# Patient Record
Sex: FEMALE | Race: BLACK OR AFRICAN AMERICAN | NOT HISPANIC OR LATINO | Employment: OTHER | URBAN - METROPOLITAN AREA
[De-identification: names, ages, dates, MRNs, and addresses within clinical notes are randomized per-mention and may not be internally consistent; named-entity substitution may affect disease eponyms.]

---

## 2023-04-20 DIAGNOSIS — I10 ESSENTIAL HYPERTENSION: ICD-10-CM

## 2023-04-21 PROBLEM — M54.50 LOWER BACK PAIN: Status: ACTIVE | Noted: 2023-04-21

## 2023-04-21 PROBLEM — M25.579 PAINFUL ANKLE: Status: ACTIVE | Noted: 2023-04-21

## 2023-04-21 PROBLEM — I10 ESSENTIAL HYPERTENSION: Status: ACTIVE | Noted: 2023-04-21

## 2023-04-21 PROBLEM — I24.9 ACS (ACUTE CORONARY SYNDROME) (MULTI): Status: ACTIVE | Noted: 2023-04-21

## 2023-04-21 PROBLEM — R10.11 ABDOMINAL PAIN, RUQ: Status: ACTIVE | Noted: 2023-04-21

## 2023-04-21 PROBLEM — R93.89 ABNORMAL MRI: Status: ACTIVE | Noted: 2023-04-21

## 2023-04-21 PROBLEM — D64.9 ANEMIA: Status: ACTIVE | Noted: 2023-04-21

## 2023-04-21 PROBLEM — E11.9 DIABETES MELLITUS (MULTI): Status: ACTIVE | Noted: 2023-04-21

## 2023-04-21 PROBLEM — M79.672 FOOT PAIN, BILATERAL: Status: ACTIVE | Noted: 2023-04-21

## 2023-04-21 PROBLEM — H10.13 ALLERGIC CONJUNCTIVITIS OF BOTH EYES: Status: ACTIVE | Noted: 2023-04-21

## 2023-04-21 PROBLEM — M54.2 NECK PAIN: Status: ACTIVE | Noted: 2023-04-21

## 2023-04-21 PROBLEM — J06.9 VIRAL UPPER RESPIRATORY TRACT INFECTION: Status: ACTIVE | Noted: 2023-04-21

## 2023-04-21 PROBLEM — M25.551 PAIN IN RIGHT HIP: Status: ACTIVE | Noted: 2023-04-21

## 2023-04-21 PROBLEM — M25.511 RIGHT SHOULDER PAIN: Status: ACTIVE | Noted: 2023-04-21

## 2023-04-21 PROBLEM — S46.911A STRAIN OF RIGHT SHOULDER: Status: ACTIVE | Noted: 2023-04-21

## 2023-04-21 PROBLEM — M79.671 FOOT PAIN, BILATERAL: Status: ACTIVE | Noted: 2023-04-21

## 2023-04-21 PROBLEM — R79.89 ELEVATED TROPONIN: Status: ACTIVE | Noted: 2023-04-21

## 2023-04-21 PROBLEM — L28.2 PRURITIC RASH: Status: ACTIVE | Noted: 2023-04-21

## 2023-04-21 PROBLEM — I25.10 ATHEROSCLEROTIC HEART DISEASE OF NATIVE CORONARY ARTERY WITHOUT ANGINA PECTORIS: Status: ACTIVE | Noted: 2023-04-21

## 2023-04-21 PROBLEM — D35.2 PITUITARY BENIGN NEOPLASM (MULTI): Status: ACTIVE | Noted: 2023-04-21

## 2023-04-21 PROBLEM — K52.9 GASTROENTERITIS: Status: ACTIVE | Noted: 2023-04-21

## 2023-04-21 PROBLEM — R05.9 COUGH: Status: ACTIVE | Noted: 2023-04-21

## 2023-04-21 PROBLEM — M19.90 ARTHRITIS: Status: ACTIVE | Noted: 2023-04-21

## 2023-04-21 PROBLEM — R12 HEARTBURN: Status: ACTIVE | Noted: 2023-04-21

## 2023-04-21 PROBLEM — R53.81 MALAISE: Status: ACTIVE | Noted: 2023-04-21

## 2023-04-21 PROBLEM — H10.9 BACTERIAL CONJUNCTIVITIS OF LEFT EYE: Status: ACTIVE | Noted: 2023-04-21

## 2023-04-21 PROBLEM — E78.5 HYPERLIPIDEMIA: Status: ACTIVE | Noted: 2023-04-21

## 2023-04-21 PROBLEM — N95.1 MENOPAUSE SYNDROME: Status: ACTIVE | Noted: 2023-04-21

## 2023-04-21 PROBLEM — E83.52 HYPERCALCEMIA: Status: ACTIVE | Noted: 2023-04-21

## 2023-04-21 PROBLEM — S39.012A STRAIN, BACK, INITIAL ENCOUNTER: Status: ACTIVE | Noted: 2023-04-21

## 2023-04-21 PROBLEM — F17.201 NICOTINE DEPENDENCE IN REMISSION: Status: ACTIVE | Noted: 2023-04-21

## 2023-04-21 PROBLEM — R63.4 ABNORMAL WEIGHT LOSS: Status: ACTIVE | Noted: 2023-04-21

## 2023-04-21 PROBLEM — E55.9 VITAMIN D DEFICIENCY: Status: ACTIVE | Noted: 2023-04-21

## 2023-04-21 PROBLEM — L03.032 PARONYCHIA OF GREAT TOE OF LEFT FOOT: Status: ACTIVE | Noted: 2023-04-21

## 2023-04-21 PROBLEM — G45.4 TRANSIENT GLOBAL AMNESIA: Status: ACTIVE | Noted: 2023-04-21

## 2023-04-21 PROBLEM — B37.31 VAGINAL YEAST INFECTION: Status: ACTIVE | Noted: 2023-04-21

## 2023-04-21 RX ORDER — CHLORTHALIDONE 25 MG/1
TABLET ORAL
Qty: 90 TABLET | Refills: 2 | Status: SHIPPED | OUTPATIENT
Start: 2023-04-21 | End: 2024-01-19

## 2023-05-11 DIAGNOSIS — I10 ESSENTIAL HYPERTENSION: ICD-10-CM

## 2023-05-13 RX ORDER — RAMIPRIL 10 MG/1
CAPSULE ORAL
Qty: 90 CAPSULE | Refills: 1 | Status: SHIPPED | OUTPATIENT
Start: 2023-05-13 | End: 2023-11-09

## 2023-05-23 DIAGNOSIS — E11.69 TYPE 2 DIABETES MELLITUS WITH OTHER SPECIFIED COMPLICATION, WITHOUT LONG-TERM CURRENT USE OF INSULIN (MULTI): ICD-10-CM

## 2023-05-23 RX ORDER — GLYBURIDE 5 MG/1
TABLET ORAL
Qty: 120 TABLET | Refills: 2 | Status: SHIPPED | OUTPATIENT
Start: 2023-05-23 | End: 2024-01-11

## 2023-05-24 ENCOUNTER — OFFICE VISIT (OUTPATIENT)
Dept: PRIMARY CARE | Facility: CLINIC | Age: 78
End: 2023-05-24
Payer: MEDICARE

## 2023-05-24 VITALS
WEIGHT: 135 LBS | DIASTOLIC BLOOD PRESSURE: 76 MMHG | HEART RATE: 68 BPM | BODY MASS INDEX: 21.79 KG/M2 | SYSTOLIC BLOOD PRESSURE: 128 MMHG

## 2023-05-24 DIAGNOSIS — I25.10 ATHEROSCLEROSIS OF NATIVE CORONARY ARTERY WITHOUT ANGINA PECTORIS, UNSPECIFIED WHETHER NATIVE OR TRANSPLANTED HEART: ICD-10-CM

## 2023-05-24 DIAGNOSIS — D35.2 PITUITARY BENIGN NEOPLASM (MULTI): ICD-10-CM

## 2023-05-24 DIAGNOSIS — E11.69 TYPE 2 DIABETES MELLITUS WITH OTHER SPECIFIED COMPLICATION, UNSPECIFIED WHETHER LONG TERM INSULIN USE (MULTI): ICD-10-CM

## 2023-05-24 DIAGNOSIS — R63.4 WEIGHT LOSS, UNINTENTIONAL: Primary | ICD-10-CM

## 2023-05-24 DIAGNOSIS — E78.2 MIXED HYPERLIPIDEMIA: ICD-10-CM

## 2023-05-24 DIAGNOSIS — I10 ESSENTIAL HYPERTENSION: ICD-10-CM

## 2023-05-24 DIAGNOSIS — E55.9 VITAMIN D DEFICIENCY: ICD-10-CM

## 2023-05-24 PROBLEM — M54.2 NECK PAIN: Status: RESOLVED | Noted: 2023-04-21 | Resolved: 2023-05-24

## 2023-05-24 PROBLEM — K52.9 GASTROENTERITIS: Status: RESOLVED | Noted: 2023-04-21 | Resolved: 2023-05-24

## 2023-05-24 PROBLEM — R10.11 ABDOMINAL PAIN, RUQ: Status: RESOLVED | Noted: 2023-04-21 | Resolved: 2023-05-24

## 2023-05-24 LAB
POC FINGERSTICK BLOOD GLUCOSE: 180 MG/DL (ref 70–100)
POC HEMOGLOBIN A1C: 7.3 % (ref 4.2–6.5)

## 2023-05-24 PROCEDURE — 83036 HEMOGLOBIN GLYCOSYLATED A1C: CPT | Performed by: INTERNAL MEDICINE

## 2023-05-24 PROCEDURE — 1160F RVW MEDS BY RX/DR IN RCRD: CPT | Performed by: INTERNAL MEDICINE

## 2023-05-24 PROCEDURE — 3078F DIAST BP <80 MM HG: CPT | Performed by: INTERNAL MEDICINE

## 2023-05-24 PROCEDURE — G0439 PPPS, SUBSEQ VISIT: HCPCS | Performed by: INTERNAL MEDICINE

## 2023-05-24 PROCEDURE — 1159F MED LIST DOCD IN RCRD: CPT | Performed by: INTERNAL MEDICINE

## 2023-05-24 PROCEDURE — 1036F TOBACCO NON-USER: CPT | Performed by: INTERNAL MEDICINE

## 2023-05-24 PROCEDURE — 82962 GLUCOSE BLOOD TEST: CPT | Performed by: INTERNAL MEDICINE

## 2023-05-24 PROCEDURE — 3074F SYST BP LT 130 MM HG: CPT | Performed by: INTERNAL MEDICINE

## 2023-05-24 PROCEDURE — 1170F FXNL STATUS ASSESSED: CPT | Performed by: INTERNAL MEDICINE

## 2023-05-24 PROCEDURE — 99214 OFFICE O/P EST MOD 30 MIN: CPT | Performed by: INTERNAL MEDICINE

## 2023-05-24 RX ORDER — ACETAMINOPHEN 500 MG
250 TABLET ORAL DAILY
COMMUNITY
Start: 2013-09-12

## 2023-05-24 RX ORDER — LANOLIN ALCOHOL/MO/W.PET/CERES
1 CREAM (GRAM) TOPICAL DAILY
COMMUNITY
Start: 2014-12-28

## 2023-05-24 RX ORDER — GABAPENTIN 300 MG/1
300 CAPSULE ORAL NIGHTLY
COMMUNITY

## 2023-05-24 RX ORDER — EMPAGLIFLOZIN 10 MG/1
10 TABLET, FILM COATED ORAL DAILY
COMMUNITY
Start: 2023-05-23 | End: 2023-05-24 | Stop reason: DRUGHIGH

## 2023-05-24 RX ORDER — ATORVASTATIN CALCIUM 80 MG/1
80 TABLET, FILM COATED ORAL DAILY
COMMUNITY
End: 2023-06-05

## 2023-05-24 RX ORDER — NAPROXEN SODIUM 220 MG/1
81 TABLET, FILM COATED ORAL
COMMUNITY

## 2023-05-24 RX ORDER — METFORMIN HYDROCHLORIDE 500 MG/1
500 TABLET, EXTENDED RELEASE ORAL
COMMUNITY
Start: 2014-01-02

## 2023-05-24 ASSESSMENT — ACTIVITIES OF DAILY LIVING (ADL)
GROCERY_SHOPPING: INDEPENDENT
BATHING: INDEPENDENT
DRESSING: INDEPENDENT
TAKING_MEDICATION: INDEPENDENT
MANAGING_FINANCES: INDEPENDENT

## 2023-05-24 ASSESSMENT — ENCOUNTER SYMPTOMS
UNEXPECTED WEIGHT CHANGE: 1
DIAPHORESIS: 0
CHOKING: 0
VOMITING: 0
FLANK PAIN: 0
EYE PAIN: 0
APPETITE CHANGE: 0
WOUND: 0
WEAKNESS: 0
SINUS PRESSURE: 0
DIARRHEA: 0
ADENOPATHY: 0
DIFFICULTY URINATING: 0
SHORTNESS OF BREATH: 0
SINUS PAIN: 0
TROUBLE SWALLOWING: 0
NECK STIFFNESS: 0
PHOTOPHOBIA: 0
EYE REDNESS: 0
FACIAL SWELLING: 0
SORE THROAT: 0
HEMATURIA: 0
DIZZINESS: 0
ANAL BLEEDING: 0
DYSURIA: 0
PALPITATIONS: 0
FREQUENCY: 0
SLEEP DISTURBANCE: 0
FATIGUE: 0
STRIDOR: 0
BLOOD IN STOOL: 0
ACTIVITY CHANGE: 0
BACK PAIN: 0
NECK PAIN: 0
POLYPHAGIA: 0
COLOR CHANGE: 0
FACIAL ASYMMETRY: 0
SEIZURES: 0
CHILLS: 0
RECTAL PAIN: 0
COUGH: 0
CONSTIPATION: 0
EYE ITCHING: 0
LIGHT-HEADEDNESS: 0
EYE DISCHARGE: 0
NUMBNESS: 0
SPEECH DIFFICULTY: 0
CHEST TIGHTNESS: 0
ARTHRALGIAS: 1
VOICE CHANGE: 0
JOINT SWELLING: 0
RHINORRHEA: 0
ABDOMINAL PAIN: 0
NAUSEA: 0
ABDOMINAL DISTENTION: 0
POLYDIPSIA: 0
TREMORS: 0
MYALGIAS: 0
HEADACHES: 0
BRUISES/BLEEDS EASILY: 0
WHEEZING: 0

## 2023-05-24 ASSESSMENT — PATIENT HEALTH QUESTIONNAIRE - PHQ9
2. FEELING DOWN, DEPRESSED OR HOPELESS: NOT AT ALL
SUM OF ALL RESPONSES TO PHQ9 QUESTIONS 1 AND 2: 0
1. LITTLE INTEREST OR PLEASURE IN DOING THINGS: NOT AT ALL

## 2023-05-24 NOTE — PROGRESS NOTES
Subjective   Patient ID: Julio Dutta Snyder is a 78 y.o. female who presents for Medicare Annual Wellness Visit Subsequent.    Patient presents for wellness exam and follow-up.  She has been compliant with her medications and diet but not exercise.  She reports having a normal appetite but continues to lose weight.  She had negative colonoscopy done.  She denies any headaches, no dizziness, no chest pain or shortness of breath.  She denies abdominal pain no nausea vomiting or diarrhea.  She reports baseline arthritis symptoms.         Review of Systems   Constitutional:  Positive for unexpected weight change. Negative for activity change, appetite change, chills, diaphoresis and fatigue.   HENT:  Negative for congestion, dental problem, drooling, ear discharge, ear pain, facial swelling, hearing loss, mouth sores, nosebleeds, postnasal drip, rhinorrhea, sinus pressure, sinus pain, sneezing, sore throat, tinnitus, trouble swallowing and voice change.    Eyes:  Negative for photophobia, pain, discharge, redness, itching and visual disturbance.   Respiratory:  Negative for cough, choking, chest tightness, shortness of breath, wheezing and stridor.    Cardiovascular:  Negative for chest pain, palpitations and leg swelling.   Gastrointestinal:  Negative for abdominal distention, abdominal pain, anal bleeding, blood in stool, constipation, diarrhea, nausea, rectal pain and vomiting.   Endocrine: Negative for cold intolerance, heat intolerance, polydipsia, polyphagia and polyuria.   Genitourinary:  Negative for decreased urine volume, difficulty urinating, dysuria, enuresis, flank pain, frequency, genital sores, hematuria and urgency.   Musculoskeletal:  Positive for arthralgias. Negative for back pain, gait problem, joint swelling, myalgias, neck pain and neck stiffness.   Skin:  Negative for color change, pallor, rash and wound.   Neurological:  Negative for dizziness, tremors, seizures, syncope, facial  asymmetry, speech difficulty, weakness, light-headedness, numbness and headaches.   Hematological:  Negative for adenopathy. Does not bruise/bleed easily.   Psychiatric/Behavioral:  Negative for sleep disturbance.        Objective   /76   Pulse 68   Wt 61.2 kg (135 lb)   BMI 21.79 kg/m²     Physical Exam  Constitutional:       Appearance: Normal appearance.   Cardiovascular:      Rate and Rhythm: Normal rate and regular rhythm.      Heart sounds: No murmur heard.     No gallop.   Pulmonary:      Effort: No respiratory distress.      Breath sounds: No wheezing or rales.   Abdominal:      General: There is no distension.      Palpations: There is no mass.      Tenderness: There is no abdominal tenderness. There is no guarding.   Musculoskeletal:      Right lower leg: No edema.      Left lower leg: No edema.   Neurological:      Mental Status: She is alert.         Assessment/Plan   Diagnoses and all orders for this visit:  Weight loss, unintentional-we will check routine labs and a CT scan.  Encourage increased p.o. intake.  We will schedule appointment with GI  -     CT chest abdomen pelvis wo IV contrast; Future  -     CBC and Auto Differential; Future  -     TSH with reflex to Free T4 if abnormal; Future  Type 2 diabetes mellitus with other specified complication, unspecified whether long term insulin use (CMS/HCC)-hemoglobin A1c was 7.3.  She will keep appointment with endocrinology.  Schedule an ophthalmology appointment  -     POCT glycosylated hemoglobin (Hb A1C) manually resulted  -     POCT Fingerstick Glucose manually resulted  -     Creatinine, Serum; Future  Pituitary benign neoplasm (CMS/HCC)-we will schedule follow-up MRI  -     MR sella w or wo IV contrast volumetric surgical planning; Future  Essential hypertension-stable on present medications  -     Uric Acid; Future  -     Urinalysis with Reflex Microscopic; Future  -     Albumin , Urine Random; Future  -     Comprehensive Metabolic Panel;  Future  Mixed hyperlipidemia-check a fast lipid profile.  -     Lipid Panel; Future  Vitamin D deficiency  -     Vitamin D, Total; Future  Atherosclerosis of native coronary artery without angina pectoris, unspecified whether native or transplanted heart-we will modify risk factors.  Follow-up with cardiology  Health maintenance-she will schedule a mammogram.  Bone density is pending.  She will schedule her colonoscopy.

## 2023-05-24 NOTE — PATIENT INSTRUCTIONS
Please schedule your CAT scan and MRI.  Obtain blood work and urine.  Schedule appointment with GI.  Follow-up in 1 month.

## 2023-06-04 DIAGNOSIS — E78.5 HYPERLIPIDEMIA, UNSPECIFIED HYPERLIPIDEMIA TYPE: ICD-10-CM

## 2023-06-05 RX ORDER — ATORVASTATIN CALCIUM 80 MG/1
TABLET, FILM COATED ORAL
Qty: 90 TABLET | Refills: 2 | Status: SHIPPED | OUTPATIENT
Start: 2023-06-05 | End: 2024-03-01

## 2023-06-26 ENCOUNTER — APPOINTMENT (OUTPATIENT)
Dept: PRIMARY CARE | Facility: CLINIC | Age: 78
End: 2023-06-26
Payer: MEDICARE

## 2023-06-26 LAB
ANION GAP IN SER/PLAS: 12 MMOL/L (ref 10–20)
CALCIUM (MG/DL) IN SER/PLAS: 10.7 MG/DL (ref 8.6–10.6)
CARBON DIOXIDE, TOTAL (MMOL/L) IN SER/PLAS: 35 MMOL/L (ref 21–32)
CHLORIDE (MMOL/L) IN SER/PLAS: 95 MMOL/L (ref 98–107)
CHOLESTEROL (MG/DL) IN SER/PLAS: 124 MG/DL (ref 0–199)
CHOLESTEROL IN HDL (MG/DL) IN SER/PLAS: 64.5 MG/DL
CHOLESTEROL/HDL RATIO: 1.9
CREATININE (MG/DL) IN SER/PLAS: 0.74 MG/DL (ref 0.5–1.05)
ESTIMATED AVERAGE GLUCOSE FOR HBA1C: 186 MG/DL
GFR FEMALE: 83 ML/MIN/1.73M2
GLUCOSE (MG/DL) IN SER/PLAS: 172 MG/DL (ref 74–99)
HEMOGLOBIN A1C/HEMOGLOBIN TOTAL IN BLOOD: 8.1 %
LDL: 48 MG/DL (ref 0–99)
POTASSIUM (MMOL/L) IN SER/PLAS: 3.7 MMOL/L (ref 3.5–5.3)
SODIUM (MMOL/L) IN SER/PLAS: 138 MMOL/L (ref 136–145)
TRIGLYCERIDE (MG/DL) IN SER/PLAS: 60 MG/DL (ref 0–149)
UREA NITROGEN (MG/DL) IN SER/PLAS: 12 MG/DL (ref 6–23)
VLDL: 12 MG/DL (ref 0–40)

## 2023-06-30 ENCOUNTER — OFFICE VISIT (OUTPATIENT)
Dept: PRIMARY CARE | Facility: CLINIC | Age: 78
End: 2023-06-30
Payer: MEDICARE

## 2023-06-30 VITALS
WEIGHT: 136 LBS | HEART RATE: 72 BPM | SYSTOLIC BLOOD PRESSURE: 118 MMHG | DIASTOLIC BLOOD PRESSURE: 76 MMHG | BODY MASS INDEX: 21.95 KG/M2

## 2023-06-30 DIAGNOSIS — I10 ESSENTIAL HYPERTENSION: ICD-10-CM

## 2023-06-30 DIAGNOSIS — I25.10 ATHEROSCLEROSIS OF NATIVE CORONARY ARTERY WITHOUT ANGINA PECTORIS, UNSPECIFIED WHETHER NATIVE OR TRANSPLANTED HEART: ICD-10-CM

## 2023-06-30 DIAGNOSIS — Z79.4 TYPE 2 DIABETES MELLITUS WITHOUT COMPLICATION, WITH LONG-TERM CURRENT USE OF INSULIN (MULTI): ICD-10-CM

## 2023-06-30 DIAGNOSIS — Z79.4 TYPE 2 DIABETES MELLITUS WITH OTHER SPECIFIED COMPLICATION, WITH LONG-TERM CURRENT USE OF INSULIN (MULTI): ICD-10-CM

## 2023-06-30 DIAGNOSIS — E78.2 MIXED HYPERLIPIDEMIA: Primary | ICD-10-CM

## 2023-06-30 DIAGNOSIS — R63.4 ABNORMAL WEIGHT LOSS: ICD-10-CM

## 2023-06-30 DIAGNOSIS — E11.69 TYPE 2 DIABETES MELLITUS WITH OTHER SPECIFIED COMPLICATION, WITH LONG-TERM CURRENT USE OF INSULIN (MULTI): ICD-10-CM

## 2023-06-30 DIAGNOSIS — E11.9 TYPE 2 DIABETES MELLITUS WITHOUT COMPLICATION, WITH LONG-TERM CURRENT USE OF INSULIN (MULTI): ICD-10-CM

## 2023-06-30 PROCEDURE — 1036F TOBACCO NON-USER: CPT | Performed by: INTERNAL MEDICINE

## 2023-06-30 PROCEDURE — 1159F MED LIST DOCD IN RCRD: CPT | Performed by: INTERNAL MEDICINE

## 2023-06-30 PROCEDURE — 3074F SYST BP LT 130 MM HG: CPT | Performed by: INTERNAL MEDICINE

## 2023-06-30 PROCEDURE — 99213 OFFICE O/P EST LOW 20 MIN: CPT | Performed by: INTERNAL MEDICINE

## 2023-06-30 PROCEDURE — 3078F DIAST BP <80 MM HG: CPT | Performed by: INTERNAL MEDICINE

## 2023-06-30 PROCEDURE — 1160F RVW MEDS BY RX/DR IN RCRD: CPT | Performed by: INTERNAL MEDICINE

## 2023-06-30 RX ORDER — BLOOD-GLUCOSE METER
KIT MISCELLANEOUS
Qty: 100 STRIP | Refills: 3 | Status: SHIPPED | OUTPATIENT
Start: 2023-06-30 | End: 2023-11-02

## 2023-06-30 ASSESSMENT — ENCOUNTER SYMPTOMS
UNEXPECTED WEIGHT CHANGE: 1
HYPERTENSION: 1

## 2023-06-30 NOTE — PROGRESS NOTES
Subjective   Patient ID: Julio Dutta [place] is a 78 y.o. female who presents for Hypertension.    Patient presents for follow-up.  She has been compliant with her medications, diet but not exercise.  She reports improvement in her hand pain.  She denies any headaches, no dizziness, no chest pain or shortness of breath.  She denies abdominal pain no nausea vomiting or diarrhea.  She reports no new musculoskeletal complaints.  She is resting.  DVT      Hypertension         Review of Systems   Constitutional:  Positive for unexpected weight change.       Objective   /76   Pulse 72   Wt 61.7 kg (136 lb)   BMI 21.95 kg/m²     Physical Exam  Constitutional:       Appearance: Normal appearance.   Cardiovascular:      Rate and Rhythm: Normal rate and regular rhythm.      Heart sounds: No murmur heard.     No gallop.   Pulmonary:      Effort: No respiratory distress.      Breath sounds: No wheezing or rales.   Abdominal:      General: There is no distension.      Palpations: There is no mass.      Tenderness: There is no abdominal tenderness. There is no guarding.   Musculoskeletal:      Right lower leg: No edema.      Left lower leg: No edema.   Neurological:      Mental Status: She is alert.         Assessment/Plan   Diagnoses and all orders for this visit:  Mixed hyperlipidemia-we will continue with present management.  LDL goal less than 70  Essential hypertension-stable on present medications  Atherosclerosis of native coronary artery without angina pectoris, unspecified whether native or transplanted heart-we will modify risk factors.  Follow cardiology  Type 2 diabetes mellitus without complication, with long-term current use of insulin (CMS/[facility])-hemoglobin A1c was 8.1.  Jardiance dose was adjusted.  Ophthalmology appointment has been done.  Schedule appointment with endocrinology   Abnormal weight loss-she will schedule appointment with GI.-Encourage increased p.o. intake.  Health  maintenance-colonoscopy is pending.  Mammogram is pending.  Bone density is pending.  Pap with GYN.  She will get a tetanus shot at the pharmacy

## 2023-08-31 PROBLEM — Z95.1 HX OF CABG: Status: ACTIVE | Noted: 2023-08-31

## 2023-08-31 RX ORDER — NITROGLYCERIN 0.4 MG/1
TABLET SUBLINGUAL EVERY 5 MIN PRN
COMMUNITY
End: 2024-02-21 | Stop reason: SDUPTHER

## 2023-08-31 RX ORDER — TRAMADOL HYDROCHLORIDE AND ACETAMINOPHEN 37.5; 325 MG/1; MG/1
1 TABLET, FILM COATED ORAL EVERY 6 HOURS PRN
COMMUNITY
End: 2024-02-13 | Stop reason: WASHOUT

## 2023-08-31 RX ORDER — ISOSORBIDE MONONITRATE 30 MG/1
1 TABLET, EXTENDED RELEASE ORAL DAILY
COMMUNITY
Start: 2020-09-17 | End: 2024-02-21 | Stop reason: ALTCHOICE

## 2023-08-31 RX ORDER — DIPHENHYDRAMINE HCL 25 MG
25 TABLET ORAL EVERY 6 HOURS PRN
COMMUNITY

## 2023-08-31 RX ORDER — MULTIVIT-MIN/IRON/FOLIC ACID/K 18-600-40
1 CAPSULE ORAL DAILY
COMMUNITY
Start: 2020-09-30

## 2023-08-31 RX ORDER — SITAGLIPTIN 100 MG/1
1 TABLET, FILM COATED ORAL DAILY
COMMUNITY
Start: 2023-01-03 | End: 2024-02-13 | Stop reason: WASHOUT

## 2023-08-31 RX ORDER — NITROGLYCERIN 0.4 MG/1
TABLET SUBLINGUAL
COMMUNITY
Start: 2023-06-21

## 2023-08-31 RX ORDER — OMEPRAZOLE 40 MG/1
40 CAPSULE, DELAYED RELEASE ORAL
COMMUNITY
End: 2024-02-21 | Stop reason: ALTCHOICE

## 2023-08-31 RX ORDER — PANTOPRAZOLE SODIUM 40 MG/1
1 TABLET, DELAYED RELEASE ORAL DAILY
COMMUNITY
Start: 2020-09-30

## 2023-08-31 RX ORDER — CLOPIDOGREL BISULFATE 75 MG/1
1 TABLET ORAL DAILY
COMMUNITY
Start: 2020-09-25 | End: 2024-02-21 | Stop reason: ALTCHOICE

## 2023-08-31 RX ORDER — DIPHENHYDRAMINE HCL 25 MG
25 CAPSULE ORAL NIGHTLY
COMMUNITY
End: 2024-02-21 | Stop reason: SDUPTHER

## 2023-09-11 ENCOUNTER — APPOINTMENT (OUTPATIENT)
Dept: PRIMARY CARE | Facility: CLINIC | Age: 78
End: 2023-09-11
Payer: MEDICARE

## 2023-09-11 DIAGNOSIS — I10 PRIMARY HYPERTENSION: Primary | ICD-10-CM

## 2023-09-11 RX ORDER — METOPROLOL TARTRATE 50 MG/1
TABLET ORAL
Qty: 180 TABLET | Refills: 1 | Status: SHIPPED | OUTPATIENT
Start: 2023-09-11 | End: 2024-03-11

## 2023-10-09 ENCOUNTER — TELEPHONE (OUTPATIENT)
Dept: PRIMARY CARE | Facility: CLINIC | Age: 78
End: 2023-10-09

## 2023-10-12 ENCOUNTER — OFFICE VISIT (OUTPATIENT)
Dept: PRIMARY CARE | Facility: CLINIC | Age: 78
End: 2023-10-12
Payer: MEDICARE

## 2023-10-12 VITALS — BODY MASS INDEX: 22.44 KG/M2 | WEIGHT: 139 LBS

## 2023-10-12 DIAGNOSIS — E11.9 TYPE 2 DIABETES MELLITUS WITHOUT COMPLICATION, WITH LONG-TERM CURRENT USE OF INSULIN (MULTI): ICD-10-CM

## 2023-10-12 DIAGNOSIS — Z23 NEED FOR INFLUENZA VACCINATION: ICD-10-CM

## 2023-10-12 DIAGNOSIS — I10 ESSENTIAL HYPERTENSION: ICD-10-CM

## 2023-10-12 DIAGNOSIS — Z79.4 TYPE 2 DIABETES MELLITUS WITHOUT COMPLICATION, WITH LONG-TERM CURRENT USE OF INSULIN (MULTI): ICD-10-CM

## 2023-10-12 DIAGNOSIS — E78.2 MIXED HYPERLIPIDEMIA: ICD-10-CM

## 2023-10-12 DIAGNOSIS — R53.81 MALAISE: ICD-10-CM

## 2023-10-12 DIAGNOSIS — I25.10 ATHEROSCLEROSIS OF NATIVE CORONARY ARTERY WITHOUT ANGINA PECTORIS, UNSPECIFIED WHETHER NATIVE OR TRANSPLANTED HEART: Primary | ICD-10-CM

## 2023-10-12 DIAGNOSIS — E55.9 VITAMIN D DEFICIENCY: ICD-10-CM

## 2023-10-12 LAB
POC FINGERSTICK BLOOD GLUCOSE: 159 MG/DL (ref 70–100)
POC HEMOGLOBIN A1C: 7.3 % (ref 4.2–6.5)

## 2023-10-12 PROCEDURE — 82962 GLUCOSE BLOOD TEST: CPT | Performed by: INTERNAL MEDICINE

## 2023-10-12 PROCEDURE — 90662 IIV NO PRSV INCREASED AG IM: CPT | Performed by: INTERNAL MEDICINE

## 2023-10-12 PROCEDURE — 1036F TOBACCO NON-USER: CPT | Performed by: INTERNAL MEDICINE

## 2023-10-12 PROCEDURE — G0008 ADMIN INFLUENZA VIRUS VAC: HCPCS | Performed by: INTERNAL MEDICINE

## 2023-10-12 PROCEDURE — 1126F AMNT PAIN NOTED NONE PRSNT: CPT | Performed by: INTERNAL MEDICINE

## 2023-10-12 PROCEDURE — 1159F MED LIST DOCD IN RCRD: CPT | Performed by: INTERNAL MEDICINE

## 2023-10-12 PROCEDURE — 83036 HEMOGLOBIN GLYCOSYLATED A1C: CPT | Performed by: INTERNAL MEDICINE

## 2023-10-12 PROCEDURE — 1160F RVW MEDS BY RX/DR IN RCRD: CPT | Performed by: INTERNAL MEDICINE

## 2023-10-12 PROCEDURE — 99496 TRANSJ CARE MGMT HIGH F2F 7D: CPT | Performed by: INTERNAL MEDICINE

## 2023-10-12 SDOH — HEALTH STABILITY: PHYSICAL HEALTH: ON AVERAGE, HOW MANY DAYS PER WEEK DO YOU ENGAGE IN MODERATE TO STRENUOUS EXERCISE (LIKE A BRISK WALK)?: 0 DAYS

## 2023-10-12 SDOH — HEALTH STABILITY: PHYSICAL HEALTH: ON AVERAGE, HOW MANY MINUTES DO YOU ENGAGE IN EXERCISE AT THIS LEVEL?: 0 MIN

## 2023-10-12 ASSESSMENT — ENCOUNTER SYMPTOMS
SEIZURES: 0
ARTHRALGIAS: 0
POLYDIPSIA: 0
HEADACHES: 0
DIZZINESS: 0
BACK PAIN: 0
PHOTOPHOBIA: 0
ANAL BLEEDING: 0
SORE THROAT: 0
EYE PAIN: 0
MYALGIAS: 0
DIARRHEA: 0
POLYPHAGIA: 0
HEMATURIA: 0
EYE REDNESS: 0
APPETITE CHANGE: 0
EYE ITCHING: 0
ABDOMINAL PAIN: 0
NUMBNESS: 0
WEAKNESS: 0
FATIGUE: 0
SINUS PRESSURE: 0
FACIAL SWELLING: 0
STRIDOR: 0
LIGHT-HEADEDNESS: 0
VOICE CHANGE: 0
PALPITATIONS: 0
ADENOPATHY: 0
BRUISES/BLEEDS EASILY: 0
FREQUENCY: 0
FLANK PAIN: 0
BLOOD IN STOOL: 0
CONSTIPATION: 0
NECK STIFFNESS: 0
SHORTNESS OF BREATH: 0
CHILLS: 0
SPEECH DIFFICULTY: 0
RHINORRHEA: 0
NAUSEA: 0
ABDOMINAL DISTENTION: 0
CHOKING: 0
CHEST TIGHTNESS: 0
RECTAL PAIN: 0
SLEEP DISTURBANCE: 0
SINUS PAIN: 0
WOUND: 0
JOINT SWELLING: 0
WHEEZING: 0
DIFFICULTY URINATING: 0
COUGH: 0
VOMITING: 0
FACIAL ASYMMETRY: 0
DIAPHORESIS: 0
EYE DISCHARGE: 0
TROUBLE SWALLOWING: 0
ACTIVITY CHANGE: 0
NECK PAIN: 0
COLOR CHANGE: 0
DYSURIA: 0
TREMORS: 0

## 2023-10-12 ASSESSMENT — LIFESTYLE VARIABLES
SKIP TO QUESTIONS 9-10: 1
HOW MANY STANDARD DRINKS CONTAINING ALCOHOL DO YOU HAVE ON A TYPICAL DAY: 1 OR 2
HOW OFTEN DO YOU HAVE A DRINK CONTAINING ALCOHOL: MONTHLY OR LESS
AUDIT-C TOTAL SCORE: 1
HOW OFTEN DO YOU HAVE SIX OR MORE DRINKS ON ONE OCCASION: NEVER

## 2023-10-12 NOTE — PROGRESS NOTES
Subjective   Patient ID: Julio Dutta [place] is a 78 y.o. female who presents for Hypertension and Diabetes.    Patient presents for follow-up.  She has been compliant with her medications, diet but not exercise.  She is currently without new complaints.  She denies any headaches, no dizziness, no sinus problems, no chest pain or shortness of breath.  She denies any abdominal pain no nausea vomiting or diarrhea.  She reports baseline back pain with radiation down her legs.         Review of Systems   Constitutional:  Negative for activity change, appetite change, chills, diaphoresis and fatigue.   HENT:  Negative for congestion, dental problem, drooling, ear discharge, ear pain, facial swelling, hearing loss, mouth sores, nosebleeds, postnasal drip, rhinorrhea, sinus pressure, sinus pain, sneezing, sore throat, tinnitus, trouble swallowing and voice change.    Eyes:  Negative for photophobia, pain, discharge, redness, itching and visual disturbance.   Respiratory:  Negative for cough, choking, chest tightness, shortness of breath, wheezing and stridor.    Cardiovascular:  Negative for chest pain, palpitations and leg swelling.   Gastrointestinal:  Negative for abdominal distention, abdominal pain, anal bleeding, blood in stool, constipation, diarrhea, nausea, rectal pain and vomiting.   Endocrine: Negative for cold intolerance, heat intolerance, polydipsia, polyphagia and polyuria.   Genitourinary:  Negative for decreased urine volume, difficulty urinating, dysuria, enuresis, flank pain, frequency, genital sores, hematuria and urgency.   Musculoskeletal:  Negative for arthralgias, back pain, gait problem, joint swelling, myalgias, neck pain and neck stiffness.   Skin:  Negative for color change, pallor, rash and wound.   Neurological:  Negative for dizziness, tremors, seizures, syncope, facial asymmetry, speech difficulty, weakness, light-headedness, numbness and headaches.   Hematological:  Negative for  adenopathy. Does not bruise/bleed easily.   Psychiatric/Behavioral:  Negative for sleep disturbance.        Objective   Wt 63 kg (139 lb)   BMI 22.44 kg/m²     Physical Exam  Constitutional:       Appearance: Normal appearance.   Cardiovascular:      Rate and Rhythm: Normal rate and regular rhythm.      Heart sounds: No murmur heard.     No gallop.   Pulmonary:      Effort: No respiratory distress.      Breath sounds: No wheezing or rales.   Abdominal:      General: There is no distension.      Palpations: There is no mass.      Tenderness: There is no abdominal tenderness. There is no guarding.   Musculoskeletal:      Right lower leg: No edema.      Left lower leg: No edema.   Neurological:      Mental Status: She is alert.         Assessment/Plan   Diagnoses and all orders for this visit:  Atherosclerosis of native coronary artery without angina pectoris, unspecified whether native or transplanted heart-we will modify risk factors.  Follow-up with cardiology  Type 2 diabetes mellitus without complication, with long-term current use of insulin (CMS/[facility])-hemoglobin A1c was 7.3.  Ophthalmology appointment has been done.  She will follow-up with endocrinology  -     POCT Fingerstick Glucose manually resulted  -     POCT glycosylated hemoglobin (Hb A1C) manually resulted  Essential hypertension-low-salt diet and exercise  -     Uric Acid; Future  -     Urinalysis with Reflex Microscopic; Future  -     Albumin , Urine Random; Future  -     Comprehensive Metabolic Panel; Future  Mixed hyperlipidemia-check a fasting lipid profile  -     Lipid Panel; Future  Malaise  -     CBC and Auto Differential; Future  -     TSH with reflex to Free T4 if abnormal; Future  Vitamin D deficiency  -     Vitamin D 25-Hydroxy,Total (for eval of Vitamin D levels); Future  Need for influenza vaccination  -     Flu vaccine, quadrivalent, high-dose, preservative free, age 65y+ (FLUZONE)  Health maintenance-mammogram outside clinic.  Pap with  GYN.  We will give a flu shot today.  She will schedule her colonoscopy

## 2023-10-12 NOTE — PATIENT INSTRUCTIONS
Please take medication as prescribed.  Follow-up in 3 months.  Diet and exercise.  Obtain fasting blood work and urine.

## 2023-10-30 DIAGNOSIS — Z79.4 TYPE 2 DIABETES MELLITUS WITH OTHER SPECIFIED COMPLICATION, WITH LONG-TERM CURRENT USE OF INSULIN (MULTI): ICD-10-CM

## 2023-10-30 DIAGNOSIS — E11.69 TYPE 2 DIABETES MELLITUS WITH OTHER SPECIFIED COMPLICATION, WITH LONG-TERM CURRENT USE OF INSULIN (MULTI): ICD-10-CM

## 2023-11-02 DIAGNOSIS — Z79.4 TYPE 2 DIABETES MELLITUS WITH OTHER SPECIFIED COMPLICATION, WITH LONG-TERM CURRENT USE OF INSULIN (MULTI): ICD-10-CM

## 2023-11-02 DIAGNOSIS — E11.69 TYPE 2 DIABETES MELLITUS WITH OTHER SPECIFIED COMPLICATION, WITH LONG-TERM CURRENT USE OF INSULIN (MULTI): ICD-10-CM

## 2023-11-02 RX ORDER — BLOOD-GLUCOSE METER
KIT MISCELLANEOUS
Qty: 100 STRIP | Refills: 3 | Status: SHIPPED | OUTPATIENT
Start: 2023-11-02 | End: 2023-11-02 | Stop reason: SDUPTHER

## 2023-11-02 RX ORDER — BLOOD-GLUCOSE METER
KIT MISCELLANEOUS
Qty: 100 STRIP | Refills: 3 | Status: SHIPPED | OUTPATIENT
Start: 2023-11-02

## 2023-11-08 DIAGNOSIS — I10 ESSENTIAL HYPERTENSION: ICD-10-CM

## 2023-11-09 RX ORDER — RAMIPRIL 10 MG/1
CAPSULE ORAL
Qty: 90 CAPSULE | Refills: 1 | Status: SHIPPED | OUTPATIENT
Start: 2023-11-09 | End: 2024-04-30

## 2024-01-02 DIAGNOSIS — E11.9 TYPE 2 DIABETES MELLITUS WITHOUT COMPLICATION, WITH LONG-TERM CURRENT USE OF INSULIN (MULTI): Primary | ICD-10-CM

## 2024-01-02 DIAGNOSIS — Z79.4 TYPE 2 DIABETES MELLITUS WITHOUT COMPLICATION, WITH LONG-TERM CURRENT USE OF INSULIN (MULTI): Primary | ICD-10-CM

## 2024-01-11 DIAGNOSIS — E11.69 TYPE 2 DIABETES MELLITUS WITH OTHER SPECIFIED COMPLICATION, WITHOUT LONG-TERM CURRENT USE OF INSULIN (MULTI): ICD-10-CM

## 2024-01-11 RX ORDER — GLYBURIDE 5 MG/1
TABLET ORAL
Qty: 120 TABLET | Refills: 2 | Status: SHIPPED | OUTPATIENT
Start: 2024-01-11 | End: 2024-04-15

## 2024-01-19 DIAGNOSIS — I10 ESSENTIAL HYPERTENSION: ICD-10-CM

## 2024-01-19 RX ORDER — CHLORTHALIDONE 25 MG/1
TABLET ORAL
Qty: 90 TABLET | Refills: 2 | Status: SHIPPED | OUTPATIENT
Start: 2024-01-19

## 2024-01-24 ENCOUNTER — TELEPHONE (OUTPATIENT)
Dept: PRIMARY CARE | Facility: CLINIC | Age: 79
End: 2024-01-24
Payer: MEDICARE

## 2024-01-24 DIAGNOSIS — E55.9 VITAMIN D DEFICIENCY: ICD-10-CM

## 2024-01-24 DIAGNOSIS — E78.2 MIXED HYPERLIPIDEMIA: ICD-10-CM

## 2024-01-24 DIAGNOSIS — I10 ESSENTIAL HYPERTENSION: Primary | ICD-10-CM

## 2024-01-24 DIAGNOSIS — R53.81 MALAISE: ICD-10-CM

## 2024-01-24 NOTE — TELEPHONE ENCOUNTER
Patient needs blood work order in the system so that she can do the lab work that you wanted her to do, so she can be obedient

## 2024-02-13 ENCOUNTER — OFFICE VISIT (OUTPATIENT)
Dept: PRIMARY CARE | Facility: CLINIC | Age: 79
End: 2024-02-13
Payer: MEDICARE

## 2024-02-13 ENCOUNTER — TELEPHONE (OUTPATIENT)
Dept: PRIMARY CARE | Facility: CLINIC | Age: 79
End: 2024-02-13

## 2024-02-13 VITALS
WEIGHT: 136.4 LBS | DIASTOLIC BLOOD PRESSURE: 78 MMHG | HEART RATE: 92 BPM | BODY MASS INDEX: 22.02 KG/M2 | SYSTOLIC BLOOD PRESSURE: 136 MMHG

## 2024-02-13 DIAGNOSIS — Z12.11 COLON CANCER SCREENING: ICD-10-CM

## 2024-02-13 DIAGNOSIS — I25.10 ATHEROSCLEROSIS OF NATIVE CORONARY ARTERY WITHOUT ANGINA PECTORIS, UNSPECIFIED WHETHER NATIVE OR TRANSPLANTED HEART: ICD-10-CM

## 2024-02-13 DIAGNOSIS — E11.69 TYPE 2 DIABETES MELLITUS WITH OTHER SPECIFIED COMPLICATION, UNSPECIFIED WHETHER LONG TERM INSULIN USE (MULTI): ICD-10-CM

## 2024-02-13 DIAGNOSIS — Z12.11 SCREENING FOR COLON CANCER: Primary | ICD-10-CM

## 2024-02-13 DIAGNOSIS — E78.2 MIXED HYPERLIPIDEMIA: ICD-10-CM

## 2024-02-13 DIAGNOSIS — D35.2 PITUITARY BENIGN NEOPLASM (MULTI): ICD-10-CM

## 2024-02-13 DIAGNOSIS — I10 ESSENTIAL HYPERTENSION: ICD-10-CM

## 2024-02-13 LAB — POC HEMOGLOBIN A1C: 7.7 % (ref 4.2–6.5)

## 2024-02-13 PROCEDURE — 1126F AMNT PAIN NOTED NONE PRSNT: CPT | Performed by: INTERNAL MEDICINE

## 2024-02-13 PROCEDURE — 1036F TOBACCO NON-USER: CPT | Performed by: INTERNAL MEDICINE

## 2024-02-13 PROCEDURE — 83036 HEMOGLOBIN GLYCOSYLATED A1C: CPT | Performed by: INTERNAL MEDICINE

## 2024-02-13 PROCEDURE — 3075F SYST BP GE 130 - 139MM HG: CPT | Performed by: INTERNAL MEDICINE

## 2024-02-13 PROCEDURE — 1160F RVW MEDS BY RX/DR IN RCRD: CPT | Performed by: INTERNAL MEDICINE

## 2024-02-13 PROCEDURE — 1159F MED LIST DOCD IN RCRD: CPT | Performed by: INTERNAL MEDICINE

## 2024-02-13 PROCEDURE — 3078F DIAST BP <80 MM HG: CPT | Performed by: INTERNAL MEDICINE

## 2024-02-13 PROCEDURE — 99213 OFFICE O/P EST LOW 20 MIN: CPT | Performed by: INTERNAL MEDICINE

## 2024-02-13 RX ORDER — POLYETHYLENE GLYCOL 3350, SODIUM SULFATE ANHYDROUS, SODIUM BICARBONATE, SODIUM CHLORIDE, POTASSIUM CHLORIDE 236; 22.74; 6.74; 5.86; 2.97 G/4L; G/4L; G/4L; G/4L; G/4L
4000 POWDER, FOR SOLUTION ORAL ONCE
Qty: 4000 ML | Refills: 0 | Status: SHIPPED | OUTPATIENT
Start: 2024-02-13 | End: 2024-02-13

## 2024-02-13 ASSESSMENT — PATIENT HEALTH QUESTIONNAIRE - PHQ9
SUM OF ALL RESPONSES TO PHQ9 QUESTIONS 1 AND 2: 0
1. LITTLE INTEREST OR PLEASURE IN DOING THINGS: NOT AT ALL
2. FEELING DOWN, DEPRESSED OR HOPELESS: NOT AT ALL

## 2024-02-13 ASSESSMENT — PAIN SCALES - GENERAL: PAINLEVEL: 0-NO PAIN

## 2024-02-13 ASSESSMENT — ENCOUNTER SYMPTOMS
CHILLS: 0
DIAPHORESIS: 0
PALPITATIONS: 0
ADENOPATHY: 0
ABDOMINAL DISTENTION: 0
WEAKNESS: 0
SINUS PRESSURE: 0
BACK PAIN: 0
SPEECH DIFFICULTY: 0
VOICE CHANGE: 0
EYE REDNESS: 0
FACIAL SWELLING: 0
NAUSEA: 0
SLEEP DISTURBANCE: 0
ANAL BLEEDING: 0
EYE ITCHING: 0
BLOOD IN STOOL: 0
ARTHRALGIAS: 0
BRUISES/BLEEDS EASILY: 0
COLOR CHANGE: 0
HEADACHES: 0
FACIAL ASYMMETRY: 0
TROUBLE SWALLOWING: 0
NUMBNESS: 0
LIGHT-HEADEDNESS: 0
VOMITING: 0
POLYDIPSIA: 0
APPETITE CHANGE: 0
RECTAL PAIN: 0
PHOTOPHOBIA: 0
EYE DISCHARGE: 0
FREQUENCY: 0
DIZZINESS: 0
JOINT SWELLING: 0
CHOKING: 0
CHEST TIGHTNESS: 0
SINUS PAIN: 0
TREMORS: 0
FATIGUE: 0
ABDOMINAL PAIN: 0
SEIZURES: 0
DYSURIA: 0
EYE PAIN: 0
RHINORRHEA: 0
MYALGIAS: 0
WOUND: 0
NECK PAIN: 0
STRIDOR: 0
COUGH: 0
NECK STIFFNESS: 0
WHEEZING: 0
CONSTIPATION: 0
DIARRHEA: 0
ACTIVITY CHANGE: 0
HEMATURIA: 0
SHORTNESS OF BREATH: 0
FLANK PAIN: 0
POLYPHAGIA: 0
SORE THROAT: 0
DIFFICULTY URINATING: 0

## 2024-02-13 NOTE — PATIENT INSTRUCTIONS
Please take medication as prescribed.  Follow-up in 3 months.  Schedule appoint with endocrinology and schedule your colonoscopy.  Obtain fasting blood work and urine.

## 2024-02-13 NOTE — PROGRESS NOTES
Subjective   Patient ID: Julio Dutta [place] is a 78 y.o. female who presents for No chief complaint on file..    Patient presents for follow-up.  She has been compliant with his medications, diet but not exercise.  She is currently without new complaints.  She reports baseline arthritis symptoms.  She denies any headaches, no dizziness, no chest pain or shortness of breath.  She denies abdominal pain no nausea vomiting or diarrhea.         Review of Systems   Constitutional:  Negative for activity change, appetite change, chills, diaphoresis and fatigue.   HENT:  Negative for congestion, dental problem, drooling, ear discharge, ear pain, facial swelling, hearing loss, mouth sores, nosebleeds, postnasal drip, rhinorrhea, sinus pressure, sinus pain, sneezing, sore throat, tinnitus, trouble swallowing and voice change.    Eyes:  Negative for photophobia, pain, discharge, redness, itching and visual disturbance.   Respiratory:  Negative for cough, choking, chest tightness, shortness of breath, wheezing and stridor.    Cardiovascular:  Negative for chest pain, palpitations and leg swelling.   Gastrointestinal:  Negative for abdominal distention, abdominal pain, anal bleeding, blood in stool, constipation, diarrhea, nausea, rectal pain and vomiting.   Endocrine: Negative for cold intolerance, heat intolerance, polydipsia, polyphagia and polyuria.   Genitourinary:  Negative for decreased urine volume, difficulty urinating, dysuria, enuresis, flank pain, frequency, genital sores, hematuria and urgency.   Musculoskeletal:  Negative for arthralgias, back pain, gait problem, joint swelling, myalgias, neck pain and neck stiffness.   Skin:  Negative for color change, pallor, rash and wound.   Neurological:  Negative for dizziness, tremors, seizures, syncope, facial asymmetry, speech difficulty, weakness, light-headedness, numbness and headaches.   Hematological:  Negative for adenopathy. Does not bruise/bleed easily.    Psychiatric/Behavioral:  Negative for sleep disturbance.        Objective   /78   Pulse 92   Wt 61.9 kg (136 lb 6.4 oz)   BMI 22.02 kg/m²     Physical Exam  Constitutional:       Appearance: Normal appearance.   Cardiovascular:      Rate and Rhythm: Normal rate and regular rhythm.      Heart sounds: No murmur heard.     No gallop.   Pulmonary:      Effort: No respiratory distress.      Breath sounds: No wheezing or rales.   Abdominal:      General: There is no distension.      Palpations: There is no mass.      Tenderness: There is no abdominal tenderness. There is no guarding.   Musculoskeletal:      Right lower leg: No edema.      Left lower leg: No edema.   Neurological:      Mental Status: She is alert.         Assessment/Plan   Diagnoses and all orders for this visit:  Screening for colon cancer  -     Colonoscopy Screening; Average Risk Patient; Future  Pituitary benign neoplasm (CMS/HCC)-MRI has been done.  Type 2 diabetes mellitus with other specified complication, unspecified whether long term insulin use (CMS/HCC)-hemoglobin A1c was 7.7.  Ophthalmology appointment has been done.  Will continue with present management  -     POCT glycosylated hemoglobin (Hb A1C) manually resulted  Atherosclerosis of native coronary artery without angina pectoris, unspecified whether native or transplanted heart-schedule follow-up appoint with cardiology.  Modify risk factor  Essential hypertension-low-salt diet and exercise  Mixed hyperlipidemia-check a fast lipid profile.  Health maintenance-she will schedule colonoscopy.  Mammogram has been done.  Bone density has been done.  She will get the RSV vaccine at the pharmacy.  Weight loss-weight is stabilized.  It is purposeful.

## 2024-02-21 ENCOUNTER — OFFICE VISIT (OUTPATIENT)
Dept: ENDOCRINOLOGY | Facility: CLINIC | Age: 79
End: 2024-02-21
Payer: MEDICARE

## 2024-02-21 VITALS
RESPIRATION RATE: 16 BRPM | SYSTOLIC BLOOD PRESSURE: 110 MMHG | HEIGHT: 66 IN | WEIGHT: 139 LBS | BODY MASS INDEX: 22.34 KG/M2 | DIASTOLIC BLOOD PRESSURE: 60 MMHG | HEART RATE: 76 BPM

## 2024-02-21 DIAGNOSIS — E78.5 HYPERLIPIDEMIA, UNSPECIFIED HYPERLIPIDEMIA TYPE: ICD-10-CM

## 2024-02-21 DIAGNOSIS — E11.69 TYPE 2 DIABETES MELLITUS WITH OTHER SPECIFIED COMPLICATION, UNSPECIFIED WHETHER LONG TERM INSULIN USE (MULTI): Primary | ICD-10-CM

## 2024-02-21 DIAGNOSIS — I10 ESSENTIAL HYPERTENSION: ICD-10-CM

## 2024-02-21 PROCEDURE — 1159F MED LIST DOCD IN RCRD: CPT | Performed by: INTERNAL MEDICINE

## 2024-02-21 PROCEDURE — 1160F RVW MEDS BY RX/DR IN RCRD: CPT | Performed by: INTERNAL MEDICINE

## 2024-02-21 PROCEDURE — 1036F TOBACCO NON-USER: CPT | Performed by: INTERNAL MEDICINE

## 2024-02-21 PROCEDURE — 1126F AMNT PAIN NOTED NONE PRSNT: CPT | Performed by: INTERNAL MEDICINE

## 2024-02-21 PROCEDURE — 99214 OFFICE O/P EST MOD 30 MIN: CPT | Performed by: INTERNAL MEDICINE

## 2024-02-21 PROCEDURE — 3074F SYST BP LT 130 MM HG: CPT | Performed by: INTERNAL MEDICINE

## 2024-02-21 PROCEDURE — 3078F DIAST BP <80 MM HG: CPT | Performed by: INTERNAL MEDICINE

## 2024-02-21 ASSESSMENT — ENCOUNTER SYMPTOMS
CHILLS: 0
PALPITATIONS: 0
VOMITING: 0
SHORTNESS OF BREATH: 0
NAUSEA: 0
FEVER: 0
FATIGUE: 0
HEADACHES: 0
DIARRHEA: 0
COUGH: 0

## 2024-02-21 NOTE — PROGRESS NOTES
Endocrinology: Follow up visit  Subjective   Patient ID: Julio Velasquez is a 78 y.o. female who presents for Diabetes (Type 2 ), Hyperlipidemia, and Hypertension.    PCP: Jono Cardenas MD    HPI  Not seen in over a year.   Reports lots of stress at home with family members requiring assistance but now things are settled down.  No sugars with her but reports they are good lately 100-150, no lows.   Currently on metformin 1-2 per day, jardiance daily (off januvia), glyburide 5 bid)   feels fine.  No concerns    Review of Systems   Constitutional:  Negative for chills, fatigue and fever.   Respiratory:  Negative for cough and shortness of breath.    Cardiovascular:  Negative for chest pain and palpitations.   Gastrointestinal:  Negative for diarrhea, nausea and vomiting.   Neurological:  Negative for headaches.       Patient Active Problem List   Diagnosis    Abnormal MRI    Abnormal weight loss    ACS (acute coronary syndrome) (CMS/HCC)    Allergic conjunctivitis of both eyes    Anemia    Arthritis    Atherosclerotic heart disease of native coronary artery without angina pectoris    Bacterial conjunctivitis of left eye    Cough    Type 2 diabetes mellitus with other specified complication, unspecified whether long term insulin use (CMS/HCC)    Elevated troponin    Essential hypertension    Foot pain, bilateral    Heartburn    Hypercalcemia    Hyperlipidemia    Lower back pain    Malaise    Menopause syndrome    Nicotine dependence in remission    Pain in right hip    Painful ankle    Paronychia of great toe of left foot    Pituitary benign neoplasm (CMS/HCC)    Pruritic rash    Right shoulder pain    Strain of right shoulder    Strain, back, initial encounter    Transient global amnesia    Vaginal yeast infection    Viral upper respiratory tract infection    Vitamin D deficiency    Hx of CABG        Home Meds:  Current Outpatient Medications   Medication Instructions    ascorbic acid, vitamin C, 500 mg  capsule 1 capsule, oral, Daily    aspirin 81 mg, oral, Daily RT    atorvastatin (Lipitor) 80 mg tablet TAKE 1 TABLET BY MOUTH DAILY    chlorthalidone (Hygroton) 25 mg tablet TAKE 1 TABLET BY MOUTH EVERY DAY    cholecalciferol (VITAMIN D-3) 250 mcg, oral, Daily    cyanocobalamin (Vitamin B-12) 1,000 mcg tablet 1 tablet, oral, Daily    diphenhydrAMINE (BENADRYL ALLERGY) 25 mg, oral, Every 6 hours PRN    empagliflozin (JARDIANCE) 25 mg, oral, Daily    FreeStyle Lite Strips strip TEST THREE TIMES DAILY    gabapentin (NEURONTIN) 300 mg, oral, Nightly    glyBURIDE (Diabeta) 5 mg tablet TAKE 2 TABLETS BY MOUTH TWICE DAILY BEFORE MEALS    metFORMIN XR (GLUCOPHAGE-XR) 500 mg, oral, Daily with evening meal    metoprolol tartrate (Lopressor) 50 mg tablet TAKE 1 TABLET BY MOUTH TWICE DAILY WITH MEALS    nitroglycerin (Nitrostat) 0.4 mg SL tablet ONE TABLET UNDER TONGUE AS NEEDED FOR CHEST PAIN EVERY 5 MINUTES FOR UP TO 3 DOSES. IF NO RELIEF CALL 911    pantoprazole (ProtoNix) 40 mg EC tablet 1 tablet, oral, Daily    ramipril (Altace) 10 mg capsule TAKE 1 CAPSULE BY MOUTH DAILY        No Known Allergies     Objective   Vitals:    02/21/24 0843   BP: 110/60   Pulse: 76   Resp: 16      Vitals:    02/21/24 0843   Weight: 63 kg (139 lb)      Body mass index is 22.44 kg/m².   Physical Exam  Constitutional:       Appearance: Normal appearance. She is overweight.   HENT:      Head: Normocephalic and atraumatic.   Neck:      Thyroid: No thyroid mass, thyromegaly or thyroid tenderness.   Cardiovascular:      Rate and Rhythm: Normal rate and regular rhythm.      Heart sounds: No murmur heard.     No gallop.   Pulmonary:      Effort: Pulmonary effort is normal.      Breath sounds: Normal breath sounds.   Abdominal:      Palpations: Abdomen is soft.      Comments: benign   Neurological:      General: No focal deficit present.      Mental Status: She is alert and oriented to person, place, and time.      Deep Tendon Reflexes: Reflexes are  "normal and symmetric.   Psychiatric:         Behavior: Behavior is cooperative.         Labs:  Lab Results   Component Value Date    HGBA1C 7.7 (A) 02/13/2024    TSH 1.81 09/10/2022      No results found for: \"PR1\", \"THYROIDPAB\", \"TSI\"     Assessment/Plan   Problem List Items Addressed This Visit       Type 2 diabetes mellitus with other specified complication, unspecified whether long term insulin use (CMS/[facility]) - Primary    Essential hypertension    Hyperlipidemia   Dm2:  Discussed course and recent A1c: she is adamant stress has been messing with numbers and back on track.  Would like to continue current meds  Discussed weight: be sure to get enough calories and protein  Htn:  Bp excellent  Lipids:  Controlled  Follow up in 6 months      Electronically signed by:  Ayleen Nolen MD 02/21/24 9:13 AM              "

## 2024-03-01 DIAGNOSIS — E78.5 HYPERLIPIDEMIA, UNSPECIFIED HYPERLIPIDEMIA TYPE: ICD-10-CM

## 2024-03-01 RX ORDER — ATORVASTATIN CALCIUM 80 MG/1
TABLET, FILM COATED ORAL
Qty: 90 TABLET | Refills: 2 | Status: SHIPPED | OUTPATIENT
Start: 2024-03-01

## 2024-03-11 DIAGNOSIS — I10 PRIMARY HYPERTENSION: ICD-10-CM

## 2024-03-11 RX ORDER — METOPROLOL TARTRATE 50 MG/1
TABLET ORAL
Qty: 180 TABLET | Refills: 1 | Status: SHIPPED | OUTPATIENT
Start: 2024-03-11

## 2024-03-14 ENCOUNTER — LAB (OUTPATIENT)
Dept: LAB | Facility: LAB | Age: 79
End: 2024-03-14
Payer: MEDICARE

## 2024-03-14 DIAGNOSIS — E55.9 VITAMIN D DEFICIENCY: ICD-10-CM

## 2024-03-14 DIAGNOSIS — I10 ESSENTIAL HYPERTENSION: ICD-10-CM

## 2024-03-14 DIAGNOSIS — E78.2 MIXED HYPERLIPIDEMIA: ICD-10-CM

## 2024-03-14 DIAGNOSIS — R53.81 MALAISE: ICD-10-CM

## 2024-03-14 LAB
25(OH)D3 SERPL-MCNC: 44 NG/ML (ref 30–100)
ALBUMIN SERPL BCP-MCNC: 4.2 G/DL (ref 3.4–5)
ALP SERPL-CCNC: 126 U/L (ref 33–136)
ALT SERPL W P-5'-P-CCNC: 35 U/L (ref 7–45)
ANION GAP SERPL CALC-SCNC: 11 MMOL/L (ref 10–20)
APPEARANCE UR: ABNORMAL
AST SERPL W P-5'-P-CCNC: 33 U/L (ref 9–39)
BASOPHILS # BLD AUTO: 0.06 X10*3/UL (ref 0–0.1)
BASOPHILS NFR BLD AUTO: 1.1 %
BILIRUB SERPL-MCNC: 0.7 MG/DL (ref 0–1.2)
BILIRUB UR STRIP.AUTO-MCNC: NEGATIVE MG/DL
BUN SERPL-MCNC: 12 MG/DL (ref 6–23)
CALCIUM SERPL-MCNC: 10.3 MG/DL (ref 8.6–10.6)
CHLORIDE SERPL-SCNC: 97 MMOL/L (ref 98–107)
CHOLEST SERPL-MCNC: 111 MG/DL (ref 0–199)
CHOLESTEROL/HDL RATIO: 2.1
CO2 SERPL-SCNC: 34 MMOL/L (ref 21–32)
COLOR UR: ABNORMAL
CREAT SERPL-MCNC: 0.71 MG/DL (ref 0.5–1.05)
CREAT UR-MCNC: 65.3 MG/DL (ref 20–320)
EGFRCR SERPLBLD CKD-EPI 2021: 87 ML/MIN/1.73M*2
EOSINOPHIL # BLD AUTO: 0.4 X10*3/UL (ref 0–0.4)
EOSINOPHIL NFR BLD AUTO: 7.2 %
ERYTHROCYTE [DISTWIDTH] IN BLOOD BY AUTOMATED COUNT: 13.8 % (ref 11.5–14.5)
GLUCOSE SERPL-MCNC: 117 MG/DL (ref 74–99)
GLUCOSE UR STRIP.AUTO-MCNC: ABNORMAL MG/DL
HCT VFR BLD AUTO: 43.5 % (ref 36–46)
HDLC SERPL-MCNC: 51.8 MG/DL
HGB BLD-MCNC: 14.1 G/DL (ref 12–16)
IMM GRANULOCYTES # BLD AUTO: 0.01 X10*3/UL (ref 0–0.5)
IMM GRANULOCYTES NFR BLD AUTO: 0.2 % (ref 0–0.9)
KETONES UR STRIP.AUTO-MCNC: NEGATIVE MG/DL
LDLC SERPL CALC-MCNC: 48 MG/DL
LEUKOCYTE ESTERASE UR QL STRIP.AUTO: ABNORMAL
LYMPHOCYTES # BLD AUTO: 2.06 X10*3/UL (ref 0.8–3)
LYMPHOCYTES NFR BLD AUTO: 37.2 %
MCH RBC QN AUTO: 29.6 PG (ref 26–34)
MCHC RBC AUTO-ENTMCNC: 32.4 G/DL (ref 32–36)
MCV RBC AUTO: 91 FL (ref 80–100)
MICROALBUMIN UR-MCNC: 14.9 MG/L
MICROALBUMIN/CREAT UR: 22.8 UG/MG CREAT
MONOCYTES # BLD AUTO: 0.53 X10*3/UL (ref 0.05–0.8)
MONOCYTES NFR BLD AUTO: 9.6 %
MUCOUS THREADS #/AREA URNS AUTO: NORMAL /LPF
NEUTROPHILS # BLD AUTO: 2.48 X10*3/UL (ref 1.6–5.5)
NEUTROPHILS NFR BLD AUTO: 44.7 %
NITRITE UR QL STRIP.AUTO: NEGATIVE
NON HDL CHOLESTEROL: 59 MG/DL (ref 0–149)
NRBC BLD-RTO: 0 /100 WBCS (ref 0–0)
PH UR STRIP.AUTO: 6.5 [PH]
PLATELET # BLD AUTO: 248 X10*3/UL (ref 150–450)
POTASSIUM SERPL-SCNC: 4.3 MMOL/L (ref 3.5–5.3)
PROT SERPL-MCNC: 6.9 G/DL (ref 6.4–8.2)
PROT UR STRIP.AUTO-MCNC: NEGATIVE MG/DL
RBC # BLD AUTO: 4.76 X10*6/UL (ref 4–5.2)
RBC # UR STRIP.AUTO: NEGATIVE /UL
RBC #/AREA URNS AUTO: NORMAL /HPF
SODIUM SERPL-SCNC: 138 MMOL/L (ref 136–145)
SP GR UR STRIP.AUTO: 1.02
SQUAMOUS #/AREA URNS AUTO: NORMAL /HPF
TRIGL SERPL-MCNC: 57 MG/DL (ref 0–149)
TSH SERPL-ACNC: 1.14 MIU/L (ref 0.44–3.98)
URATE SERPL-MCNC: 4.5 MG/DL (ref 2.3–6.7)
UROBILINOGEN UR STRIP.AUTO-MCNC: NORMAL MG/DL
VLDL: 11 MG/DL (ref 0–40)
WBC # BLD AUTO: 5.5 X10*3/UL (ref 4.4–11.3)
WBC #/AREA URNS AUTO: NORMAL /HPF

## 2024-03-14 PROCEDURE — 82570 ASSAY OF URINE CREATININE: CPT

## 2024-03-14 PROCEDURE — 85025 COMPLETE CBC W/AUTO DIFF WBC: CPT

## 2024-03-14 PROCEDURE — 36415 COLL VENOUS BLD VENIPUNCTURE: CPT

## 2024-03-14 PROCEDURE — 80053 COMPREHEN METABOLIC PANEL: CPT

## 2024-03-14 PROCEDURE — 80061 LIPID PANEL: CPT

## 2024-03-14 PROCEDURE — 82306 VITAMIN D 25 HYDROXY: CPT

## 2024-03-14 PROCEDURE — 84550 ASSAY OF BLOOD/URIC ACID: CPT

## 2024-03-14 PROCEDURE — 82043 UR ALBUMIN QUANTITATIVE: CPT

## 2024-03-14 PROCEDURE — 81001 URINALYSIS AUTO W/SCOPE: CPT

## 2024-03-14 PROCEDURE — 84443 ASSAY THYROID STIM HORMONE: CPT

## 2024-04-03 ENCOUNTER — HOSPITAL ENCOUNTER (OUTPATIENT)
Dept: RADIOLOGY | Facility: CLINIC | Age: 79
Discharge: HOME | End: 2024-04-03
Payer: MEDICARE

## 2024-04-03 DIAGNOSIS — R06.2 WHEEZING: ICD-10-CM

## 2024-04-03 DIAGNOSIS — R05.1 ACUTE COUGH: ICD-10-CM

## 2024-04-03 PROCEDURE — 71046 X-RAY EXAM CHEST 2 VIEWS: CPT | Performed by: RADIOLOGY

## 2024-04-03 PROCEDURE — 71046 X-RAY EXAM CHEST 2 VIEWS: CPT

## 2024-04-14 DIAGNOSIS — E11.69 TYPE 2 DIABETES MELLITUS WITH OTHER SPECIFIED COMPLICATION, WITHOUT LONG-TERM CURRENT USE OF INSULIN (MULTI): ICD-10-CM

## 2024-04-15 RX ORDER — GLYBURIDE 5 MG/1
TABLET ORAL
Qty: 120 TABLET | Refills: 2 | Status: SHIPPED | OUTPATIENT
Start: 2024-04-15

## 2024-04-17 ENCOUNTER — APPOINTMENT (OUTPATIENT)
Dept: GASTROENTEROLOGY | Facility: EXTERNAL LOCATION | Age: 79
End: 2024-04-17
Payer: MEDICARE

## 2024-04-23 PROBLEM — K21.9 GASTROESOPHAGEAL REFLUX DISEASE: Status: ACTIVE | Noted: 2024-04-23

## 2024-04-23 PROBLEM — I25.2 HISTORY OF MYOCARDIAL INFARCTION: Status: ACTIVE | Noted: 2024-04-23

## 2024-04-25 ENCOUNTER — APPOINTMENT (OUTPATIENT)
Dept: CARDIOLOGY | Facility: CLINIC | Age: 79
End: 2024-04-25
Payer: MEDICARE

## 2024-04-25 ENCOUNTER — APPOINTMENT (OUTPATIENT)
Dept: ORTHOPEDIC SURGERY | Facility: CLINIC | Age: 79
End: 2024-04-25
Payer: MEDICARE

## 2024-04-27 DIAGNOSIS — I10 ESSENTIAL HYPERTENSION: ICD-10-CM

## 2024-04-30 ENCOUNTER — TELEPHONE (OUTPATIENT)
Dept: PRIMARY CARE | Facility: CLINIC | Age: 79
End: 2024-04-30
Payer: MEDICARE

## 2024-04-30 RX ORDER — RAMIPRIL 10 MG/1
CAPSULE ORAL
Qty: 90 CAPSULE | Refills: 1 | Status: SHIPPED | OUTPATIENT
Start: 2024-04-30

## 2024-05-07 ENCOUNTER — OFFICE VISIT (OUTPATIENT)
Dept: GASTROENTEROLOGY | Facility: EXTERNAL LOCATION | Age: 79
End: 2024-05-07
Payer: MEDICARE

## 2024-05-07 DIAGNOSIS — Z12.11 SCREENING FOR COLON CANCER: ICD-10-CM

## 2024-05-07 DIAGNOSIS — E11.9 TYPE 2 DIABETES MELLITUS WITHOUT COMPLICATION, WITHOUT LONG-TERM CURRENT USE OF INSULIN (MULTI): ICD-10-CM

## 2024-05-07 DIAGNOSIS — Z86.010 PERSONAL HISTORY OF COLONIC POLYPS: Primary | ICD-10-CM

## 2024-05-07 DIAGNOSIS — D12.4 BENIGN NEOPLASM OF DESCENDING COLON: ICD-10-CM

## 2024-05-07 DIAGNOSIS — I25.10 ATHEROSCLEROSIS OF NATIVE CORONARY ARTERY OF NATIVE HEART WITHOUT ANGINA PECTORIS: ICD-10-CM

## 2024-05-07 DIAGNOSIS — K57.30 DIVERTICULOSIS OF LARGE INTESTINE WITHOUT DIVERTICULITIS: ICD-10-CM

## 2024-05-07 PROCEDURE — 1160F RVW MEDS BY RX/DR IN RCRD: CPT | Performed by: INTERNAL MEDICINE

## 2024-05-07 PROCEDURE — 88305 TISSUE EXAM BY PATHOLOGIST: CPT | Performed by: STUDENT IN AN ORGANIZED HEALTH CARE EDUCATION/TRAINING PROGRAM

## 2024-05-07 PROCEDURE — 88305 TISSUE EXAM BY PATHOLOGIST: CPT

## 2024-05-07 PROCEDURE — 45385 COLONOSCOPY W/LESION REMOVAL: CPT | Performed by: INTERNAL MEDICINE

## 2024-05-07 PROCEDURE — 1159F MED LIST DOCD IN RCRD: CPT | Performed by: INTERNAL MEDICINE

## 2024-05-08 ENCOUNTER — LAB REQUISITION (OUTPATIENT)
Dept: LAB | Facility: HOSPITAL | Age: 79
End: 2024-05-08
Payer: MEDICARE

## 2024-05-13 NOTE — PROGRESS NOTES
[facility]  Hand and Upper Extremity Service  Follow up visit         Follow up for: Right wrist pain     Interval History:               Past medical history, medications, allergies, surgical history and review of systems are reviewed and otherwise unchanged when compared to last visit on 2/21/23         Examination:  Constitutional: Oriented to person, place, and time.  Appears well-developed and well-nourished.  Head: Normocephalic and atraumatic.  Eyes: Pupils are equal, round, and reactive to light.  Cardiovascular: Intact distal pulses.  Pulmonary/Chest/Breast: Effort normal. No respiratory distress.  Neurological: Alert and oriented to person, place, and time.  Skin: Skin is warm and dry.  Psychiatric: normal mood and affect.  Behavior is normal.  Musculoskeletal: Right hand reveals        Personal Interpretation of Diagnostic studies:          Impression:          Plan:         In Office Procedures Performed:          Medical Decision Making:          Medications Prescribed: None          Follow up:              Narinder Cuellar MD  [facility]  Department of Orthopaedic Surgery  Hand and Upper Extremity Reconstruction    Scribe Attestation  By signing my name below, ILaura , Scribe   attest that this documentation has been prepared under the direction and in the presence of Dr. Narinder Cuellar.    Dictation performed with the use of voice recognition software.  Syntax and grammatical errors may exist.

## 2024-05-14 ENCOUNTER — APPOINTMENT (OUTPATIENT)
Dept: ORTHOPEDIC SURGERY | Facility: HOSPITAL | Age: 79
End: 2024-05-14
Payer: MEDICARE

## 2024-05-17 LAB
LABORATORY COMMENT REPORT: NORMAL
PATH REPORT.FINAL DX SPEC: NORMAL
PATH REPORT.GROSS SPEC: NORMAL
PATH REPORT.RELEVANT HX SPEC: NORMAL
PATH REPORT.TOTAL CANCER: NORMAL

## 2024-06-11 ENCOUNTER — APPOINTMENT (OUTPATIENT)
Dept: OBSTETRICS AND GYNECOLOGY | Facility: CLINIC | Age: 79
End: 2024-06-11
Payer: MEDICARE

## 2024-06-11 ENCOUNTER — APPOINTMENT (OUTPATIENT)
Dept: ORTHOPEDIC SURGERY | Facility: HOSPITAL | Age: 79
End: 2024-06-11
Payer: MEDICARE

## 2024-06-15 ENCOUNTER — HOSPITAL ENCOUNTER (OUTPATIENT)
Dept: RADIOLOGY | Facility: CLINIC | Age: 79
Discharge: HOME | End: 2024-06-15
Payer: MEDICARE

## 2024-06-15 DIAGNOSIS — T14.90XA INJURY: ICD-10-CM

## 2024-06-15 PROCEDURE — 73610 X-RAY EXAM OF ANKLE: CPT | Mod: LEFT SIDE | Performed by: RADIOLOGY

## 2024-06-15 PROCEDURE — 73630 X-RAY EXAM OF FOOT: CPT | Mod: LEFT SIDE | Performed by: RADIOLOGY

## 2024-06-15 PROCEDURE — 73630 X-RAY EXAM OF FOOT: CPT | Mod: LT

## 2024-06-15 PROCEDURE — 73630 X-RAY EXAM OF FOOT: CPT | Mod: RIGHT SIDE | Performed by: RADIOLOGY

## 2024-06-15 PROCEDURE — 73630 X-RAY EXAM OF FOOT: CPT | Mod: RT

## 2024-06-15 PROCEDURE — 73610 X-RAY EXAM OF ANKLE: CPT | Mod: LT

## 2024-06-18 NOTE — PROGRESS NOTES
Primary Care Physician: Jono Cardenas MD  Date of Visit: 06/19/2024  1:00 PM EDT  Location of visit: [facility] [place] [address]   Last office visit: June 21, 2023    Chief Complaint:     Follow-up CAD (annual)    HPI/Summary  Julio Velasquez is a 79 y.o. female who presents for followup cardiology evaluation.     The patient is status post bypass surgery in 2006.  The problem list includes diabetes and hypertension.  In 2020, she presented with chest pain, troponin was minimally elevated, and she was treated conservatively.  Ticagrelor was added.  Catheterization was not pursued.  A stress test on September 11, 2020 showed no evidence for ischemia or scar, with an ejection fraction of 68%.    She continues on aspirin, high-dose atorvastatin, ramipril, metoprolol, and a diabetes regimen including glyburide, Jardiance and metformin.  A recent lipid panel showed a cholesterol of 111, HDL 52, LDL 48, triglycerides 57 mg/dL.  Hemoglobin A1c 7.7%    Currently, no cardiovascular symptoms.  She sustained a fall over the weekend and sustained a left ankle sprain.  She came to the office in a boot.  She will receive some physical therapy.  She did not experience syncope, just a mechanical fall while on a bus trip with her [employer].  Specialty Problems          Cardiology Problems    ACS (acute coronary syndrome) (Multi)    Atherosclerotic heart disease of native coronary artery without angina pectoris    Elevated troponin    Essential hypertension    Hyperlipidemia    Nicotine dependence in remission    Hx of CABG    History of myocardial infarction        Past Medical History:   Diagnosis Date    Cutaneous abscess of groin 09/26/2014    Abscess of groin    Old myocardial infarction 11/16/2022    Past myocardial infarction    Other conditions influencing health status 05/24/2013    Familial Combined Hyperlipidemia    Personal history of other diseases of the circulatory system 05/24/2013    History of hypertension     Personal history of other diseases of the digestive system     History of gastroesophageal reflux (GERD)    Personal history of other diseases of the respiratory system 2014    History of chronic sinusitis          Past Surgical History:   Procedure Laterality Date    CARPAL TUNNEL RELEASE  2013    Neuroplasty Decompression Median Nerve At Carpal Tunnel    COLONOSCOPY  2018    Complete Colonoscopy    COLONOSCOPY      rpt     CORONARY ARTERY BYPASS GRAFT  2021    CABG    CORONARY ARTERY BYPASS GRAFT  2013    CABG    MR HEAD ANGIO WO IV CONTRAST  2015    MR HEAD ANGIO WO IV CONTRAST 2015 [facility]    MR NECK ANGIO WO IV CONTRAST  2015    MR NECK ANGIO WO IV CONTRAST 2015 [facility]            Social History     Tobacco Use    Smoking status: Former     Current packs/day: 0.00     Average packs/day: 1 pack/day for 20.0 years (20.0 ttl pk-yrs)     Types: Cigarettes     Start date:      Quit date:      Years since quittin.4    Smokeless tobacco: Never   Substance Use Topics    Alcohol use: Never    Drug use: Never                 No Known Allergies      Current Outpatient Medications   Medication Instructions    ascorbic acid, vitamin C, 500 mg capsule 1 capsule, oral, Daily    aspirin 81 mg, oral, Daily RT    atorvastatin (Lipitor) 80 mg tablet TAKE 1 TABLET BY MOUTH DAILY    chlorthalidone (Hygroton) 25 mg tablet TAKE 1 TABLET BY MOUTH EVERY DAY    cholecalciferol (VITAMIN D-3) 250 mcg, oral, Daily    cyanocobalamin (Vitamin B-12) 1,000 mcg tablet 1 tablet, oral, Daily    diphenhydrAMINE (BENADRYL ALLERGY) 25 mg, oral, Every 6 hours PRN    empagliflozin (JARDIANCE) 25 mg, oral, Daily    FreeStyle Lite Strips strip TEST THREE TIMES DAILY    gabapentin (NEURONTIN) 300 mg, oral, Nightly    glyBURIDE (Diabeta) 5 mg tablet TAKE 2 TABLETS BY MOUTH TWICE DAILY BEFORE MEALS    metFORMIN XR (GLUCOPHAGE-XR) 500 mg, oral, Daily with  "evening meal    metoprolol tartrate (Lopressor) 50 mg tablet TAKE 1 TABLET BY MOUTH TWICE DAILY WITH MEALS    nitroglycerin (Nitrostat) 0.4 mg SL tablet ONE TABLET UNDER TONGUE AS NEEDED FOR CHEST PAIN EVERY 5 MINUTES FOR UP TO 3 DOSES. IF NO RELIEF CALL 911    ramipril (Altace) 10 mg capsule TAKE 1 CAPSULE BY MOUTH DAILY       ROS     Vital Signs:  Vitals:    06/19/24 1304   BP: 100/64   BP Location: Left arm   Patient Position: Sitting   BP Cuff Size: Adult   Pulse: 70   SpO2: 93%   Weight: 63.3 kg (139 lb 8 oz)   Height: 1.664 m (5' 5.5\")     Wt Readings from Last 2 Encounters:   06/19/24 63.3 kg (139 lb 8 oz)   06/19/24 61.2 kg (135 lb)     Body mass index is 22.86 kg/m².       Physical Exam:    She was oriented x 3 and alert.  She was not in any distress.  The lungs were clear.  Auscultation of the heart was unremarkable, with no murmurs or gallops.  There was no edema.  Otherwise, she was not examined.     Last Labs:  CMP:  Recent Labs     03/14/24  1213 06/24/23  1046 01/20/23  1510 09/10/22  0100 11/02/21  1235    138 139 138 139   K 4.3 3.7 4.1 4.1 4.0   CL 97* 95* 97* 96* 96*   CO2 34* 35* 37* 36* 35*   ANIONGAP 11 12 9* 10 12   BUN 12 12 12 13 6   CREATININE 0.71 0.74 0.73 0.77 0.58   EGFR 87  --   --   --   --    GLUCOSE 117* 172* 128* 104* 130*     Recent Labs     03/14/24  1213 09/10/22  0100 08/02/21  1151 01/14/21  1320 09/22/20  1638   ALBUMIN 4.2 4.2 4.8 4.6 4.6   ALKPHOS 126 110 135 126 107   ALT 35 26 24 26 35   AST 33 27 23 27 29   BILITOT 0.7 0.5 0.5 0.5 0.5     CBC:  Recent Labs     03/14/24  1213 09/10/22  0100 08/02/21  1151 09/22/20  1638 09/17/20  0549   WBC 5.5 6.1 7.1 9.0 6.7   HGB 14.1 13.0 13.6 12.9 12.5   HCT 43.5 39.1 43.0 39.9 37.5    301 287 301 275   MCV 91 92 95 92 88     COAG:   Recent Labs     09/14/20  1329   INR 1.1     HEME/ENDO:  Recent Labs     03/14/24  1213 02/13/24  1059 10/12/23  1053 06/24/23  1046 05/24/23  1118 09/10/22  0100 08/02/21  1151 " 01/14/21  1320 09/15/20  0518   TSH 1.14  --   --   --   --  1.81 1.71  --  1.98   HGBA1C  --  7.7* 7.3* 8.1* 7.3*  --   --    < > 7.6    < > = values in this interval not displayed.      CARDIAC:   Recent Labs     09/22/20  1638 09/14/20  1329   BNP 29 33     Recent Labs     03/14/24  1213 06/24/23  1046 09/10/22  0100 08/02/21  1151   CHOL 111 124 113 107   LDLF  --  48 51 43   HDL 51.8 64.5 51.1 52.7   TRIG 57 60 57 59       Last Cardiology Tests:    ECG:    November 16, 2022 tracing reviewed today shows normal sinus rhythm and T wave abnormality consider anterior ischemia.  Repeat tracing today was unchanged previously.    Echo:  Echo Results:  No results found for this or any previous visit from the past 3650 days.       Cath:      Stress Test:  Stress Results:  No results found for this or any previous visit from the past 365 days.         Cardiac Imaging:        Assessment/Plan     We reviewed the patient's problem list.  She has remained clinically stable.  Recent noninvasive cardiac testing showed no evidence for ischemia, and LV systolic function is preserved.  Lipids are at goal.  No side effects from an SGLT2 2 inhibitor.  She follows closely with her primary care provider.  We can see her again at annual intervals, sooner as needed.      Orders:  No orders of the defined types were placed in this encounter.     Followup Appts:  Future Appointments   Date Time Provider Department Center   6/21/2024 12:00 PM Jono Cardenas MD [id number] [facility]   7/2/2024  2:15 PM Narinder Cuellar MD [id number] [facility]   7/9/2024  4:00 PM Dimitris Russ MD [id number] [facility]   7/11/2024  3:30 PM Dimitris Russ MD [id number] [facility]   8/19/2024  2:15 PM Teisha Rodriguez MD [id number] [facility]   8/21/2024 11:00 AM Ayleen Nolen MD [id number] [facility]           ____________________________________________________________  Jake Smith MD    Senior Attending Physician  [facility]  [facility]    [facility]  [employer]

## 2024-06-19 ENCOUNTER — OFFICE VISIT (OUTPATIENT)
Dept: CARDIOLOGY | Facility: CLINIC | Age: 79
End: 2024-06-19
Payer: MEDICARE

## 2024-06-19 ENCOUNTER — OFFICE VISIT (OUTPATIENT)
Dept: ORTHOPEDIC SURGERY | Facility: HOSPITAL | Age: 79
End: 2024-06-19
Payer: MEDICARE

## 2024-06-19 VITALS — HEIGHT: 65 IN | BODY MASS INDEX: 22.49 KG/M2 | WEIGHT: 135 LBS

## 2024-06-19 VITALS
HEART RATE: 70 BPM | HEIGHT: 66 IN | WEIGHT: 139.5 LBS | BODY MASS INDEX: 22.42 KG/M2 | OXYGEN SATURATION: 93 % | SYSTOLIC BLOOD PRESSURE: 100 MMHG | DIASTOLIC BLOOD PRESSURE: 64 MMHG

## 2024-06-19 DIAGNOSIS — I25.10 ATHEROSCLEROSIS OF NATIVE CORONARY ARTERY OF NATIVE HEART WITHOUT ANGINA PECTORIS: Primary | ICD-10-CM

## 2024-06-19 DIAGNOSIS — E78.00 PURE HYPERCHOLESTEROLEMIA: ICD-10-CM

## 2024-06-19 DIAGNOSIS — Z95.1 HX OF CABG: ICD-10-CM

## 2024-06-19 DIAGNOSIS — I25.2 HISTORY OF MYOCARDIAL INFARCTION: ICD-10-CM

## 2024-06-19 DIAGNOSIS — S93.402A MODERATE LEFT ANKLE SPRAIN, INITIAL ENCOUNTER: Primary | ICD-10-CM

## 2024-06-19 DIAGNOSIS — S80.211A ABRASION, KNEE, RIGHT, INITIAL ENCOUNTER: ICD-10-CM

## 2024-06-19 DIAGNOSIS — S80.212A ABRASION, KNEE, LEFT, INITIAL ENCOUNTER: ICD-10-CM

## 2024-06-19 DIAGNOSIS — I10 ESSENTIAL HYPERTENSION: ICD-10-CM

## 2024-06-19 PROCEDURE — 99214 OFFICE O/P EST MOD 30 MIN: CPT | Performed by: PHYSICIAN ASSISTANT

## 2024-06-19 PROCEDURE — 1126F AMNT PAIN NOTED NONE PRSNT: CPT | Performed by: INTERNAL MEDICINE

## 2024-06-19 PROCEDURE — 1159F MED LIST DOCD IN RCRD: CPT | Performed by: PHYSICIAN ASSISTANT

## 2024-06-19 PROCEDURE — G2211 COMPLEX E/M VISIT ADD ON: HCPCS | Performed by: INTERNAL MEDICINE

## 2024-06-19 PROCEDURE — 93010 ELECTROCARDIOGRAM REPORT: CPT | Performed by: INTERNAL MEDICINE

## 2024-06-19 PROCEDURE — 3078F DIAST BP <80 MM HG: CPT | Performed by: INTERNAL MEDICINE

## 2024-06-19 PROCEDURE — 93005 ELECTROCARDIOGRAM TRACING: CPT | Performed by: INTERNAL MEDICINE

## 2024-06-19 PROCEDURE — 1036F TOBACCO NON-USER: CPT | Performed by: PHYSICIAN ASSISTANT

## 2024-06-19 PROCEDURE — 99214 OFFICE O/P EST MOD 30 MIN: CPT | Mod: 25 | Performed by: INTERNAL MEDICINE

## 2024-06-19 PROCEDURE — 1160F RVW MEDS BY RX/DR IN RCRD: CPT | Performed by: INTERNAL MEDICINE

## 2024-06-19 PROCEDURE — 1125F AMNT PAIN NOTED PAIN PRSNT: CPT | Performed by: PHYSICIAN ASSISTANT

## 2024-06-19 PROCEDURE — 1159F MED LIST DOCD IN RCRD: CPT | Performed by: INTERNAL MEDICINE

## 2024-06-19 PROCEDURE — 1036F TOBACCO NON-USER: CPT | Performed by: INTERNAL MEDICINE

## 2024-06-19 PROCEDURE — 3074F SYST BP LT 130 MM HG: CPT | Performed by: INTERNAL MEDICINE

## 2024-06-19 ASSESSMENT — PAIN SCALES - GENERAL
PAINLEVEL_OUTOF10: 4
PAINLEVEL: 0-NO PAIN

## 2024-06-19 ASSESSMENT — PAIN - FUNCTIONAL ASSESSMENT: PAIN_FUNCTIONAL_ASSESSMENT: 0-10

## 2024-06-19 ASSESSMENT — PAIN DESCRIPTION - DESCRIPTORS: DESCRIPTORS: ACHING

## 2024-06-19 ASSESSMENT — ENCOUNTER SYMPTOMS
DEPRESSION: 0
LOSS OF SENSATION IN FEET: 0
OCCASIONAL FEELINGS OF UNSTEADINESS: 0

## 2024-06-19 ASSESSMENT — COLUMBIA-SUICIDE SEVERITY RATING SCALE - C-SSRS
1. IN THE PAST MONTH, HAVE YOU WISHED YOU WERE DEAD OR WISHED YOU COULD GO TO SLEEP AND NOT WAKE UP?: NO
2. HAVE YOU ACTUALLY HAD ANY THOUGHTS OF KILLING YOURSELF?: NO
6. HAVE YOU EVER DONE ANYTHING, STARTED TO DO ANYTHING, OR PREPARED TO DO ANYTHING TO END YOUR LIFE?: NO

## 2024-06-19 ASSESSMENT — PATIENT HEALTH QUESTIONNAIRE - PHQ9
1. LITTLE INTEREST OR PLEASURE IN DOING THINGS: NOT AT ALL
SUM OF ALL RESPONSES TO PHQ9 QUESTIONS 1 AND 2: 0
2. FEELING DOWN, DEPRESSED OR HOPELESS: NOT AT ALL

## 2024-06-19 NOTE — PROGRESS NOTES
Subjective    Patient ID: Julio Velasquez is a 79 y.o. female.    Chief Complaint: Pain of the Left Ankle           HPI:  Julio Velasquez is a 79 y.o. female who presents to the orthopedic walk-in clinic for complaint of left ankle pain and abrasions to both knees.  She was on a bus trip with her [employer] over the weekend and was speaking to someone and then suddenly fell.  She does not have a specific reason as to why she fell.  She noted sharp pain over the lateral aspect of her ankle as well as abrasions to both knees.  When they returned back to this area she went to an urgent care clinic and had x-rays performed.  She was placed in a boot which she has been using when ambulatory.  She states that symptoms are slowly improving.  She has not noticed any issues or problems with the abrasions.  She has been keeping them covered with bandages.  She is a diabetic.    ROS  Constitutional: No fever, no chills, not feeling tired, no recent weight gain and no recent weight loss  ENT: No nosebleeds  Cardiovascular: No chest pain  Respiratory: No shortness of breath and no cough  Gastrointestinal: No abdominal pain, no nausea, no diarrhea, and no vomiting  Musculoskeletal: No arthralgias  Integumentary: No rashes and no skin lesions  Neurological: No headache  Psychiatric: No sleep disturbances no depression  Endocrine: No muscle weakness and no muscle cramps  Hematologic/lymphatic: No swelling glands and no tendency for easy bruising    Past Medical History:   Diagnosis Date    Cutaneous abscess of groin 09/26/2014    Abscess of groin    Old myocardial infarction 11/16/2022    Past myocardial infarction    Other conditions influencing health status 05/24/2013    Familial Combined Hyperlipidemia    Personal history of other diseases of the circulatory system 05/24/2013    History of hypertension    Personal history of other diseases of the digestive system     History of gastroesophageal reflux  (GERD)    Personal history of other diseases of the respiratory system 01/02/2014    History of chronic sinusitis        Past Surgical History:   Procedure Laterality Date    CARPAL TUNNEL RELEASE  05/24/2013    Neuroplasty Decompression Median Nerve At Carpal Tunnel    COLONOSCOPY  07/03/2018    Complete Colonoscopy    COLONOSCOPY  2018    rpt 2023    CORONARY ARTERY BYPASS GRAFT  03/25/2021    CABG    CORONARY ARTERY BYPASS GRAFT  05/24/2013    CABG    MR HEAD ANGIO WO IV CONTRAST  01/02/2015    MR HEAD ANGIO WO IV CONTRAST 1/2/2015 [facility]    MR NECK ANGIO WO IV CONTRAST  01/02/2015    MR NECK ANGIO WO IV CONTRAST 1/2/2015 [facility]          Current Outpatient Medications:     ascorbic acid, vitamin C, 500 mg capsule, Take 1 capsule by mouth once daily., Disp: , Rfl:     aspirin 81 mg chewable tablet, Chew 1 tablet (81 mg) once daily., Disp: , Rfl:     atorvastatin (Lipitor) 80 mg tablet, TAKE 1 TABLET BY MOUTH DAILY, Disp: 90 tablet, Rfl: 2    chlorthalidone (Hygroton) 25 mg tablet, TAKE 1 TABLET BY MOUTH EVERY DAY, Disp: 90 tablet, Rfl: 2    cholecalciferol (Vitamin D-3) 50 mcg (2,000 unit) capsule, Take 5 capsules (250 mcg) by mouth once daily., Disp: , Rfl:     cyanocobalamin (Vitamin B-12) 1,000 mcg tablet, Take 1 tablet (1,000 mcg) by mouth once daily., Disp: , Rfl:     diphenhydrAMINE (Benadryl Allergy) 25 mg tablet, Take 1 tablet (25 mg) by mouth every 6 hours if needed., Disp: , Rfl:     empagliflozin (Jardiance) 25 mg, Take 1 tablet (25 mg) by mouth once daily., Disp: 90 tablet, Rfl: 2    FreeStyle Lite Strips strip, TEST THREE TIMES DAILY, Disp: 100 strip, Rfl: 3    gabapentin (Neurontin) 300 mg capsule, Take 1 capsule (300 mg) by mouth once daily at bedtime., Disp: , Rfl:     glyBURIDE (Diabeta) 5 mg tablet, TAKE 2 TABLETS BY MOUTH TWICE DAILY BEFORE MEALS, Disp: 120 tablet, Rfl: 2    metFORMIN XR (Glucophage-XR) 500 mg 24 hr tablet, Take 1 tablet (500 mg) by mouth once daily  in the evening. Take with meals., Disp: , Rfl:     metoprolol tartrate (Lopressor) 50 mg tablet, TAKE 1 TABLET BY MOUTH TWICE DAILY WITH MEALS, Disp: 180 tablet, Rfl: 1    nitroglycerin (Nitrostat) 0.4 mg SL tablet, ONE TABLET UNDER TONGUE AS NEEDED FOR CHEST PAIN EVERY 5 MINUTES FOR UP TO 3 DOSES. IF NO RELIEF CALL 911, Disp: , Rfl:     pantoprazole (ProtoNix) 40 mg EC tablet, Take 1 tablet (40 mg) by mouth once daily., Disp: , Rfl:     ramipril (Altace) 10 mg capsule, TAKE 1 CAPSULE BY MOUTH DAILY, Disp: 90 capsule, Rfl: 1     No Known Allergies     Social Connections: Not on file          Objective   79-year-old female well appearing in no acute distress. Alert and oriented ×3.  Skin intact bilateral lower extremities.   Slightly antalgic gait. Coordination and balance intact.  Bilateral lower extremity compartments supple.  5 out of 5 distal motor strength bilaterally.  L4 through S1 sensation intact bilaterally.  2+ DP/PT pulses bilaterally.  Both knees have abrasions on the anterior aspect.  No surrounding erythema or active drainage.  Left ankle with mild swelling.  Tenderness over the ATFL.  No tenderness over the distal or proximal fibula.  No tenderness over the medial aspect of the ankle or fifth metatarsal.  She has fairly good active range of motion of the left ankle in all directions.    Image Results:  X-rays of the left foot and ankle dated 6/15/2024 were reviewed today.  These demonstrated a small avulsion fracture off the talus, best appreciated on the lateral view.      Assessment/Plan   Encounter Diagnoses:  Moderate left ankle sprain, initial encounter    Abrasion, knee, left, initial encounter    Abrasion, knee, right, initial encounter    Orders Placed This Encounter    Referral to Physical Therapy       For the abrasion she will continue to keep an eye on these.  I would anticipate they will continue to heal over time.  She is going to continue with her boot and use whenever ambulatory for  the next 2 weeks.  She is try to keep her foot and ankle up and elevated is much as possible.  Continue frequent icing.  She is referred for a course outpatient physical therapy as well.  Recommend follow-up with one of our foot and ankle specialist in 2 to 3 weeks.    This office note was dictated using Dragon voice to text software and was not proofread for spelling or grammatical errors

## 2024-06-21 ENCOUNTER — APPOINTMENT (OUTPATIENT)
Dept: PRIMARY CARE | Facility: CLINIC | Age: 79
End: 2024-06-21
Payer: MEDICARE

## 2024-06-21 ENCOUNTER — TELEPHONE (OUTPATIENT)
Dept: PRIMARY CARE | Facility: CLINIC | Age: 79
End: 2024-06-21

## 2024-06-21 NOTE — TELEPHONE ENCOUNTER
Patient would like to have a podiatry referral because she fell while on vacation and would to get your opinion

## 2024-06-22 LAB
ATRIAL RATE: 70 BPM
P AXIS: 67 DEGREES
P OFFSET: 202 MS
P ONSET: 140 MS
PR INTERVAL: 166 MS
Q ONSET: 223 MS
QRS COUNT: 12 BEATS
QRS DURATION: 86 MS
QT INTERVAL: 394 MS
QTC CALCULATION(BAZETT): 425 MS
QTC FREDERICIA: 415 MS
R AXIS: -6 DEGREES
T AXIS: 31 DEGREES
T OFFSET: 420 MS
VENTRICULAR RATE: 70 BPM

## 2024-06-26 ENCOUNTER — APPOINTMENT (OUTPATIENT)
Dept: PHYSICAL THERAPY | Facility: CLINIC | Age: 79
End: 2024-06-26
Payer: MEDICARE

## 2024-06-27 ENCOUNTER — EVALUATION (OUTPATIENT)
Dept: PHYSICAL THERAPY | Facility: CLINIC | Age: 79
End: 2024-06-27
Payer: MEDICARE

## 2024-06-27 DIAGNOSIS — M79.674 GREAT TOE PAIN, RIGHT: ICD-10-CM

## 2024-06-27 DIAGNOSIS — M25.572 ACUTE LEFT ANKLE PAIN: Primary | ICD-10-CM

## 2024-06-27 PROCEDURE — 97110 THERAPEUTIC EXERCISES: CPT | Mod: GP | Performed by: PHYSICAL THERAPIST

## 2024-06-27 PROCEDURE — 97161 PT EVAL LOW COMPLEX 20 MIN: CPT | Mod: GP | Performed by: PHYSICAL THERAPIST

## 2024-06-27 ASSESSMENT — PAIN SCALES - GENERAL: PAINLEVEL_OUTOF10: 3

## 2024-06-27 ASSESSMENT — ENCOUNTER SYMPTOMS
LOSS OF SENSATION IN FEET: 0
DEPRESSION: 0
OCCASIONAL FEELINGS OF UNSTEADINESS: 0

## 2024-06-27 ASSESSMENT — PAIN - FUNCTIONAL ASSESSMENT: PAIN_FUNCTIONAL_ASSESSMENT: 0-10

## 2024-06-27 NOTE — PROGRESS NOTES
Physical Therapy  Physical Therapy Orthopedic Evaluation    Patient Name: Julio Velasquez  MRN: [id number]  Today's Date: 6/27/2024  Time Calculation  Start Time: 1230  Stop Time: 1318  Time Calculation (min): 48 min  PT Evaluation Time Entry  PT Evaluation (Low) Time Entry: 31  PT Therapeutic Procedures Time Entry  Therapeutic Exercise Time Entry: 15       Insurance:  Payor: MEDICARE / Plan: MEDICARE PART A AND B / Product Type: *No Product type* /   Number of Treatments Authorized: 1/MN  Certification Period Start Date: 06/27/24  Certification Period End Date: 09/25/24    Current Problem  1. Acute left ankle pain  Follow Up In Physical Therapy      2. Great toe pain, right            General:  General  Reason for Referral: L ankle sprain with avulsion fx, R great toe pain  Referred By: Bennie Ibarra (will see Dr Russ)    Precautions:   Precautions  STEADI Fall Risk Score (The score of 4 or more indicates an increased risk of falling): 3  Precautions Comment: low fall risk, boot until f/u with Dr Russ    Medical History Form: Reviewed (scanned into chart)    Subjective:   Subjective   Chief Complaint: Patient reports she was on a bus trip when she fell. Was on uneven ground and there was a curbstep. Twisted L ankle and landed on R>L knees, hurt R great toe with bruising. Notes she went to urgent care when she came home and had x-rays - gave her a short walking boot and saw Bennie Ibarra at ortho walk in clinic. Has been using the boot outside of her house but sometimes walking around her home without it. Difficulty with daily tasks and being on her feet. Pain is the worst at night around lateral portion on ankle. Also great toe pain. Some abrasions on R>L but healing.       Pain:  Pain Assessment: 0-10  0-10 (Numeric) Pain Score: 3 (5/10 at night)  Pain Type: Acute pain  Pain Location: Ankle  Pain Orientation: Left (*also R great toe)    Relevant Information (PMH & Previous Tests/Imaging): Avulsion fracture  dorsal aspect of the anterior talus     Prior Level of Function (PLOF)  Patient previously independent with all ADLs  Exercise/Physical Activity: walks  Work/School: retired     Patients Living Environment: Reviewed and no concern    Primary Language: English    Patient's Goal(s) for Therapy: Improve walking and get out of boot    Personal factors/comorbidities affecting outcomes: none    Red Flags: Do you have any of the following? No  Fever/chills, unexplained weight changes, dizziness/fainting, unexplained change in bowel or bladder functions, unexplained malaise or muscle weakness, night pain/sweats, numbness or tingling    Objective:  Pt ambulating with L short boot, widened DIVINA    AROM Ankle (degrees)      PF (R,L) 40, 38      DF (R,L) 9, 0-15      Inversion (R,L) 23, 25      Eversion (R,L) 15, 12    MMT NT tests d/t pain and fx    Mod restriction with L great toe ext; significant restriction with pain at R great toe ext     Edema:  Malleoli R,L  22.8cm, 25.0cm  Figure 8 R,L 52.0 cm, 49.8cm    Outcome Measures:  Other Measures  Lower Extremity Funtional Score (LEFS): 24/80     EDUCATION: Home exercise program, plan of care, activity modifications, pain management, and injury pathology  Outpatient Education  Individual(s) Educated: Patient  Education Provided: Home Exercise Program, Anatomy, POC  Patient/Caregiver Demonstrated Understanding: yes  Plan of Care Discussed and Agreed Upon: yes  Patient Response to Education: Patient/Caregiver Verbalized Understanding of Information  Education Comment: Access Code: [email or web address]  URL: [email or web address]  Date: 06/27/2024  Prepared by: Karely Carter    Program Notes  Walking boot until appt with Dr Russ     Exercises  - Supine Active Ankle Pumps  - 2 x daily - 7 x weekly - 2 sets - 10 reps  - Supine Ankle Circles  - 2 x daily - 7 x weekly - 2 sets - 10 reps  - Seated Toe Towel Scrunches  - 2 x daily - 7 x weekly - 2 sets - 10 reps  - Seated  Ankle Dorsiflexion AROM  - 2 x daily - 7 x weekly - 2 sets - 10 reps    Treatment Performed:  Therapeutic Exercise  Therapeutic Exercise Performed: Yes  Therapeutic Exercise Activity 1: Educated on wearing walking boot until appt with Rascoe to ensure healing  Therapeutic Exercise Activity 2: Performed all exercises for HEP 1x/through    Assessment: Patient is 79 year old who presents to physical therapy with signs and symptoms consistent with L ankle pain s/p sprain and R great toe pain. Patient has decreased ankle ROM and strength limiting functional mobility and ADLs. Pt would benefit from skilled physical therapy in order to address the stated deficits and return to daily tasks with reduced pain and improved function.    Clinical Presentation: Stable and/or uncomplicated characteristics  Personal Factors: Transportation- daughter is taking her now     Plan:  Treatment/Interventions: Cryotherapy, Education/ Instruction, Electrical stimulation, Manual therapy, Neuromuscular re-education, Self care/ home management, Taping techniques, Therapeutic activities, Therapeutic exercises, Vasopneumatic device, Gait training  PT Plan: Skilled PT (L ankle mobility and LQ strengthening, R great toe mobility - f/u with rascoe)  PT Frequency: 2 times per week  Duration: 8-10 weeks, reducing frequency as goals are met  Onset Date: 24  Certification Period Start Date: 24  Certification Period End Date: 24  Number of Treatments Authorized: 1/MN  Rehab Potential: Good  Plan of Care Agreement: Patient      Goals: Set and discussed today  Active       L ankle       STG/LTG       Start:  24    Expected End:  24       ST) Patient will improve LEFS score by 9 points in order to perform functional activities at home and in the community in 4 weeks.  2) Patient will be able to complete ADLs with pain less than 3/10 in 4 weeks.  3) Pt will improve ankle AROM DF by 15 degrees to be able to complete ADLs  with less difficulty in 4 weeks.   4) Patient will be independent with HEP to allow for continued improvement in daily tasks at home and in the community in 3 visits.   LT) Patient will have >/=4+/5 strength in lateral ankle stabilizers to aid in balance with ambulation on varied surfaces in community in 8 weeks  2) Patient will be able to perform proper squatting technique in order to reduce compression on knee and prevent increased pain with daily tasks in 8 weeks.  3) Patient will be able to perform >10 seconds of SLS on even ground in order to allow for safe ambulation and to demonstrate reduced fall risk within the community in 8 weeks.   4) Patient will improve LEFS score >/=56/80 points in order to perform functional activities at home and in the community by discharge.  5) Pt will ambulate safety and without deficits x 300' for short distance community ambulation without increased pain and to reduce fall risk by discharge.              Plan of care was developed with input and agreement by the patient      Karely Carter, PT

## 2024-07-02 ENCOUNTER — OFFICE VISIT (OUTPATIENT)
Dept: ORTHOPEDIC SURGERY | Facility: HOSPITAL | Age: 79
End: 2024-07-02
Payer: MEDICARE

## 2024-07-02 VITALS — HEIGHT: 66 IN | BODY MASS INDEX: 22.34 KG/M2 | WEIGHT: 139 LBS

## 2024-07-02 DIAGNOSIS — M65.331 TRIGGER FINGER, RIGHT MIDDLE FINGER: Primary | ICD-10-CM

## 2024-07-02 PROCEDURE — 1036F TOBACCO NON-USER: CPT | Performed by: ORTHOPAEDIC SURGERY

## 2024-07-02 PROCEDURE — 1159F MED LIST DOCD IN RCRD: CPT | Performed by: ORTHOPAEDIC SURGERY

## 2024-07-02 PROCEDURE — 99213 OFFICE O/P EST LOW 20 MIN: CPT | Performed by: ORTHOPAEDIC SURGERY

## 2024-07-02 PROCEDURE — 2500000004 HC RX 250 GENERAL PHARMACY W/ HCPCS (ALT 636 FOR OP/ED): Performed by: ORTHOPAEDIC SURGERY

## 2024-07-02 PROCEDURE — 20550 NJX 1 TENDON SHEATH/LIGAMENT: CPT | Mod: RT | Performed by: ORTHOPAEDIC SURGERY

## 2024-07-02 PROCEDURE — 2500000005 HC RX 250 GENERAL PHARMACY W/O HCPCS: Performed by: ORTHOPAEDIC SURGERY

## 2024-07-02 RX ORDER — TRIAMCINOLONE ACETONIDE 40 MG/ML
20 INJECTION, SUSPENSION INTRA-ARTICULAR; INTRAMUSCULAR
Status: COMPLETED | OUTPATIENT
Start: 2024-07-02 | End: 2024-07-02

## 2024-07-02 RX ORDER — LIDOCAINE HYDROCHLORIDE 10 MG/ML
0.5 INJECTION INFILTRATION; PERINEURAL
Status: COMPLETED | OUTPATIENT
Start: 2024-07-02 | End: 2024-07-02

## 2024-07-02 ASSESSMENT — PAIN DESCRIPTION - DESCRIPTORS
DESCRIPTORS: ACHING;SORE
DESCRIPTORS: ACHING;TINGLING;SORE

## 2024-07-02 ASSESSMENT — PAIN SCALES - GENERAL
PAINLEVEL_OUTOF10: 5 - MODERATE PAIN
PAINLEVEL_OUTOF10: 5 - MODERATE PAIN

## 2024-07-02 ASSESSMENT — PAIN - FUNCTIONAL ASSESSMENT
PAIN_FUNCTIONAL_ASSESSMENT: 0-10
PAIN_FUNCTIONAL_ASSESSMENT: 0-10

## 2024-07-02 NOTE — PROGRESS NOTES
[facility]  Hand and Upper Extremity Service  Follow up visit         Follow up for: Right hand/wrist     Interval History: Last seen for right index and middle finger trigger fingers. She had a right middle finger trigger injection last year with good resolution of symptoms. However, about a month ago she reports her symptoms have started to return. She has difficulty making a tight fist. She also fell onto her right hand and sustained an abrasion over her hypothenar immanence. She feels that it's healing normally.               Past medical history, medications, allergies, surgical history and review of systems are reviewed and otherwise unchanged when compared to last visit on 2/21/23         Examination:  Constitutional: Oriented to person, place, and time.  Appears well-developed and well-nourished.  Head: Normocephalic and atraumatic.  Eyes: Pupils are equal, round, and reactive to light.  Cardiovascular: Intact distal pulses.  Pulmonary/Chest/Breast: Effort normal. No respiratory distress.  Neurological: Alert and oriented to person, place, and time.  Skin: Skin is warm and dry.  Psychiatric: normal mood and affect.  Behavior is normal.  Musculoskeletal: Right hand reveals small scab over base of hypothenar immanence. No sign of infection. No tenderness to palpation at that area. Tenderness to palpation over middle finger A1 pulley. Triggers with terminal middle finger flexion.       Personal Interpretation of Diagnostic studies: No new images obtained       Impression: Right middle finger trigger finger       Plan: We've suggested a repeat trigger finger injection since she responded well to injections 16 months ago.       In Office Procedures Performed: Right middle finger trigger finger injection   Hand / UE Inj/Asp: R long A1 for trigger finger on 7/2/2024 9:49 PM  Indications: tendon swelling and pain  Details: 25 G needle, volar approach  Medications: 0.5 mL lidocaine  10 mg/mL (1 %); 20 mg triamcinolone acetonide 40 mg/mL  Outcome: tolerated well, no immediate complications  Procedure, treatment alternatives, risks and benefits explained, specific risks discussed. Consent was given by the patient. Immediately prior to procedure a time out was called to verify the correct patient, procedure, equipment, support staff and site/side marked as required. Patient was prepped and draped in the usual sterile fashion.             Follow up: As needed             Narinder Cuellar MD  [facility]  Department of Orthopaedic Surgery  Hand and Upper Extremity Reconstruction    Scribe Attestation  By signing my name below, I, Dee Ocampo   attest that this documentation has been prepared under the direction and in the presence of Dr. Narinder Cuellar.    Dictation performed with the use of voice recognition software.  Syntax and grammatical errors may exist.

## 2024-07-05 ENCOUNTER — APPOINTMENT (OUTPATIENT)
Dept: PRIMARY CARE | Facility: CLINIC | Age: 79
End: 2024-07-05
Payer: MEDICARE

## 2024-07-09 ENCOUNTER — APPOINTMENT (OUTPATIENT)
Dept: ORTHOPEDIC SURGERY | Facility: CLINIC | Age: 79
End: 2024-07-09
Payer: MEDICARE

## 2024-07-11 ENCOUNTER — OFFICE VISIT (OUTPATIENT)
Dept: ORTHOPEDIC SURGERY | Facility: CLINIC | Age: 79
End: 2024-07-11
Payer: MEDICARE

## 2024-07-11 DIAGNOSIS — M20.21 HALLUX RIGIDUS OF RIGHT FOOT: ICD-10-CM

## 2024-07-11 DIAGNOSIS — S93.402A MODERATE LEFT ANKLE SPRAIN, INITIAL ENCOUNTER: Primary | ICD-10-CM

## 2024-07-11 DIAGNOSIS — M20.11 HALLUX VALGUS OF RIGHT FOOT: ICD-10-CM

## 2024-07-11 PROCEDURE — 1125F AMNT PAIN NOTED PAIN PRSNT: CPT | Performed by: STUDENT IN AN ORGANIZED HEALTH CARE EDUCATION/TRAINING PROGRAM

## 2024-07-11 PROCEDURE — 1159F MED LIST DOCD IN RCRD: CPT | Performed by: STUDENT IN AN ORGANIZED HEALTH CARE EDUCATION/TRAINING PROGRAM

## 2024-07-11 PROCEDURE — 99213 OFFICE O/P EST LOW 20 MIN: CPT | Performed by: STUDENT IN AN ORGANIZED HEALTH CARE EDUCATION/TRAINING PROGRAM

## 2024-07-11 RX ORDER — DICLOFENAC SODIUM 10 MG/G
2 GEL TOPICAL 4 TIMES DAILY
Qty: 200 G | Refills: 0 | Status: CANCELLED | OUTPATIENT
Start: 2024-07-11

## 2024-07-11 ASSESSMENT — PAIN DESCRIPTION - DESCRIPTORS: DESCRIPTORS: ACHING;SORE

## 2024-07-11 ASSESSMENT — PAIN SCALES - GENERAL: PAINLEVEL_OUTOF10: 8

## 2024-07-11 ASSESSMENT — PAIN - FUNCTIONAL ASSESSMENT: PAIN_FUNCTIONAL_ASSESSMENT: 0-10

## 2024-07-11 NOTE — PROGRESS NOTES
ORTHOPAEDIC SURGERY OUTPATIENT PROGRESS NOTE    Chief Complaint:  Bilateral foot pain    History Of Present Illness  Julio Velasquez is a 79 y.o. female who presents for follow-up of left ankle pain and reported right foot pain.  Patient is reporting moderate to severe pain in both of these regions.  She is present with her daughter today.  She has been ambulating with use of a walking boot.  Pain has been ongoing and unchanged from 2024.  The patient reports that the boot has been clunky and impairing her ambulation.  She reports no prior history of similar injury.  She has been experiencing great toe pain on the right, particularly with motion.  She denies any new numbness, tingling or weakness.     Past Medical History  Past Medical History:   Diagnosis Date    Cutaneous abscess of groin 2014    Abscess of groin    Old myocardial infarction 2022    Past myocardial infarction    Other conditions influencing health status 2013    Familial Combined Hyperlipidemia    Personal history of other diseases of the circulatory system 2013    History of hypertension    Personal history of other diseases of the digestive system     History of gastroesophageal reflux (GERD)    Personal history of other diseases of the respiratory system 2014    History of chronic sinusitis       Surgical History  Recent Surgeries in Orthopaedic Surgery            No cases to display             Social History  Social History     Socioeconomic History    Marital status:    Tobacco Use    Smoking status: Former     Current packs/day: 0.00     Average packs/day: 1 pack/day for 20.0 years (20.0 ttl pk-yrs)     Types: Cigarettes     Start date:      Quit date: 2005     Years since quittin.5    Smokeless tobacco: Never   Substance and Sexual Activity    Alcohol use: Never    Drug use: Never     Social Determinants of Health     Physical Activity: Inactive (10/12/2023)    Exercise  Vital Sign     Days of Exercise per Week: 0 days     Minutes of Exercise per Session: 0 min       Family History  Family History   Problem Relation Name Age of Onset    Cancer Father      Other (GASTRIC CANCER) Other FAMILY HISTORY         Allergies  No Known Allergies    Review of Systems  REVIEW OF SYSTEMS  Constitutional: no unplanned weight loss  Psychiatric: no suicidal ideation  ENT: no vision changes, no sinus problems  Pulmonary: no shortness of breath  Lymphatic: no enlarged lymph nodes  Cardiovascular: no chest pain or shortness of breath  Gastrointestinal: no stomach problems  Genitourinary: no dysuria   Skin: no rashes  Endocrine: no thyroid problems  Neurological: no headache, no numbness  Hematological: no easy bruising  Musculoskeletal: Right foot pain     Physical Exam  PHYSICAL EXAMINATION  Constitutional Exam: well developed and well nourished  Psychiatric Exam: alert and oriented, appropriate mood and behavior  Eye Exam: EOMI  Pulmonary Exam: breathing non-labored, no apparent distress  Lymphatic exam: no appreciable lymphadenopathy in the lower extremities  Cardiovascular exam: RRR to peripheral palpation, DP pulses 2+, PT 2+, toes are pink with good capillary refill, no pitting edema  Skin exam: no open lesions, rashes, abrasions or ulcerations  Neurological exam: sensation to light touch intact in both lower extremities in peripheral and dermatomal distributions (except for any abnormalities noted in musculoskeletal exam)    Musculoskeletal exam: Right lower extremity examination.  Patient pain localized to the first metatarsophalangeal joint, she has an obvious dorsal bunion with tenderness to palpation.  No obvious erythema or skin breakdown.  Patient has painful and restricted first metatarsophalangeal joint range of motion.  Otherwise supple and pain-free subtalar and midtarsal joint range of motion.  Patient has sensation intact light touch grossly to saphenous, sural, superficial  peroneal, deep peroneal and tibial nerve distribution.  She has intact PF/DF/EHL.  She is 2+ DP/PT pulse palpated.    Left lower extremity examination.  Patient pain previously localized to the ATFL.  She is nontender to palpation on examination, no obvious ankle effusion.  Patient is nontender to palpation along the diaphysis of the fifth metatarsal.  Patient has relatively pain-free and supple ankle, subtalar midtarsal joint range of motion. Patient has sensation intact light touch grossly to saphenous, sural, superficial peroneal, deep peroneal and tibial nerve distribution.  She has intact PF/DF/EHL.  She is 2+ DP/PT pulse palpated.     Last Recorded Vitals  There were no vitals taken for this visit.    Laboratory Results  No results found for this or any previous visit (from the past 24 hour(s)).     Radiology Results  X-ray imaging 3 view nonweightbearing left foot and ankle as well as right foot reviewed from 06/15/2024 and independently evaluated by me in 07/11/2024 demonstrates no acute fracture or dislocation.  Ankle mortise appears well aligned.  Patient has bilateral hallux valgus with decreased joint space of the first metatarsophalangeal joint consistent with hallux rigidus.    Assessment/Plan:  79-year-old female who my impression has resolving pain from left ankle sprain and right great toe pain likely clinically consistent with HR/HV.  I have reviewed the diagnosis and treatment options extensively with the patient and her daughter.  I recommend she continue weightbearing to her tolerance in her bilateral lower extremities, she may transition out of the walking boot.  I have no formal restrictions for her at this time point.  I provided her with information regarding a Bird's extension orthosis.  She may utilize a topical anti-inflammatory for symptomatic relief.  I have encouraged the patient to contact the office if he develops any new pain, worsening symptoms or if she has any further  questions.  I will otherwise plan to see the patient back on an as-needed basis.  Upon return, patient would require three-view weightbearing bilateral feet.    Dimitris Russ MD, NIGHAT  Department of Orthopaedic Surgery  [facility]    The diagnosis and treatment plan were reviewed with the patient. All questions were answered. The patient verbalized understanding of the treatment plan. There were no barriers to understanding identified.    Note dictated with HTP software.  Completed without full type editing and sent to avoid delay.

## 2024-07-12 ENCOUNTER — APPOINTMENT (OUTPATIENT)
Dept: PHYSICAL THERAPY | Facility: CLINIC | Age: 79
End: 2024-07-12
Payer: MEDICARE

## 2024-07-16 ENCOUNTER — APPOINTMENT (OUTPATIENT)
Dept: PHYSICAL THERAPY | Facility: CLINIC | Age: 79
End: 2024-07-16
Payer: MEDICARE

## 2024-07-19 ENCOUNTER — APPOINTMENT (OUTPATIENT)
Dept: PHYSICAL THERAPY | Facility: CLINIC | Age: 79
End: 2024-07-19
Payer: MEDICARE

## 2024-07-22 ENCOUNTER — APPOINTMENT (OUTPATIENT)
Dept: PRIMARY CARE | Facility: CLINIC | Age: 79
End: 2024-07-22
Payer: MEDICARE

## 2024-07-22 VITALS
SYSTOLIC BLOOD PRESSURE: 110 MMHG | DIASTOLIC BLOOD PRESSURE: 76 MMHG | HEART RATE: 72 BPM | WEIGHT: 131.6 LBS | BODY MASS INDEX: 21.57 KG/M2

## 2024-07-22 DIAGNOSIS — Z12.31 ENCOUNTER FOR SCREENING MAMMOGRAM FOR MALIGNANT NEOPLASM OF BREAST: Primary | ICD-10-CM

## 2024-07-22 DIAGNOSIS — E78.00 PURE HYPERCHOLESTEROLEMIA: ICD-10-CM

## 2024-07-22 DIAGNOSIS — S93.402S MODERATE LEFT ANKLE SPRAIN, SEQUELA: ICD-10-CM

## 2024-07-22 DIAGNOSIS — I25.10 ATHEROSCLEROSIS OF NATIVE CORONARY ARTERY OF NATIVE HEART WITHOUT ANGINA PECTORIS: ICD-10-CM

## 2024-07-22 DIAGNOSIS — E11.69 TYPE 2 DIABETES MELLITUS WITH OTHER SPECIFIED COMPLICATION, UNSPECIFIED WHETHER LONG TERM INSULIN USE (MULTI): ICD-10-CM

## 2024-07-22 DIAGNOSIS — I10 ESSENTIAL HYPERTENSION: ICD-10-CM

## 2024-07-22 DIAGNOSIS — R63.4 ABNORMAL WEIGHT LOSS: ICD-10-CM

## 2024-07-22 LAB
POC FINGERSTICK BLOOD GLUCOSE: 153 MG/DL (ref 70–100)
POC HEMOGLOBIN A1C: 8.2 % (ref 4.2–6.5)

## 2024-07-22 PROCEDURE — 99213 OFFICE O/P EST LOW 20 MIN: CPT | Performed by: INTERNAL MEDICINE

## 2024-07-22 PROCEDURE — 82962 GLUCOSE BLOOD TEST: CPT | Performed by: INTERNAL MEDICINE

## 2024-07-22 PROCEDURE — 3078F DIAST BP <80 MM HG: CPT | Performed by: INTERNAL MEDICINE

## 2024-07-22 PROCEDURE — 1170F FXNL STATUS ASSESSED: CPT | Performed by: INTERNAL MEDICINE

## 2024-07-22 PROCEDURE — 1160F RVW MEDS BY RX/DR IN RCRD: CPT | Performed by: INTERNAL MEDICINE

## 2024-07-22 PROCEDURE — 1126F AMNT PAIN NOTED NONE PRSNT: CPT | Performed by: INTERNAL MEDICINE

## 2024-07-22 PROCEDURE — 1159F MED LIST DOCD IN RCRD: CPT | Performed by: INTERNAL MEDICINE

## 2024-07-22 PROCEDURE — G0439 PPPS, SUBSEQ VISIT: HCPCS | Performed by: INTERNAL MEDICINE

## 2024-07-22 PROCEDURE — 83036 HEMOGLOBIN GLYCOSYLATED A1C: CPT | Performed by: INTERNAL MEDICINE

## 2024-07-22 PROCEDURE — 3074F SYST BP LT 130 MM HG: CPT | Performed by: INTERNAL MEDICINE

## 2024-07-22 ASSESSMENT — ENCOUNTER SYMPTOMS
ADENOPATHY: 0
EYE ITCHING: 0
ACTIVITY CHANGE: 0
STRIDOR: 0
APPETITE CHANGE: 0
CHEST TIGHTNESS: 0
BACK PAIN: 0
FATIGUE: 0
TROUBLE SWALLOWING: 0
SEIZURES: 0
TREMORS: 0
DIARRHEA: 0
DIFFICULTY URINATING: 0
SORE THROAT: 0
EYE REDNESS: 0
EYE DISCHARGE: 0
SLEEP DISTURBANCE: 0
NUMBNESS: 0
NECK STIFFNESS: 0
ANAL BLEEDING: 0
DYSURIA: 0
DIZZINESS: 0
SPEECH DIFFICULTY: 0
FLANK PAIN: 0
DIAPHORESIS: 0
EYE PAIN: 0
NAUSEA: 0
RECTAL PAIN: 0
MYALGIAS: 0
SINUS PAIN: 0
WHEEZING: 0
LIGHT-HEADEDNESS: 0
BRUISES/BLEEDS EASILY: 0
SHORTNESS OF BREATH: 0
CHILLS: 0
PHOTOPHOBIA: 0
WEAKNESS: 0
FACIAL SWELLING: 0
CHOKING: 0
HEMATURIA: 0
POLYPHAGIA: 0
ABDOMINAL PAIN: 0
VOICE CHANGE: 0
NECK PAIN: 0
COUGH: 0
ABDOMINAL DISTENTION: 0
POLYDIPSIA: 0
ARTHRALGIAS: 1
HEADACHES: 0
PALPITATIONS: 0
RHINORRHEA: 0
COLOR CHANGE: 0
BLOOD IN STOOL: 0
FACIAL ASYMMETRY: 0
CONSTIPATION: 0
VOMITING: 0
FREQUENCY: 0
WOUND: 0
JOINT SWELLING: 0
SINUS PRESSURE: 0

## 2024-07-22 ASSESSMENT — ACTIVITIES OF DAILY LIVING (ADL)
DOING_HOUSEWORK: INDEPENDENT
GROCERY_SHOPPING: INDEPENDENT
MANAGING_FINANCES: INDEPENDENT
DRESSING: INDEPENDENT
BATHING: INDEPENDENT

## 2024-07-22 ASSESSMENT — PAIN SCALES - GENERAL: PAINLEVEL: 0-NO PAIN

## 2024-07-22 NOTE — PROGRESS NOTES
Subjective   Patient ID: Julio Dutta [place] is a 79 y.o. female who presents for Follow-up (diabetes) and Medicare Annual Wellness Visit Subsequent.    Patient presents for follow-up.  She has been compliant with her diet but not exercise.  She reports improvement in her left ankle pain.  She has not taking metformin regularly.  She overall feels well.  She denies any headaches, no dizziness, no chest pain or shortness of breath.  She denies abdominal pain no nausea vomiting or diarrhea.         Review of Systems   Constitutional:  Negative for activity change, appetite change, chills, diaphoresis and fatigue.   HENT:  Negative for congestion, dental problem, drooling, ear discharge, ear pain, facial swelling, hearing loss, mouth sores, nosebleeds, postnasal drip, rhinorrhea, sinus pressure, sinus pain, sneezing, sore throat, tinnitus, trouble swallowing and voice change.    Eyes:  Negative for photophobia, pain, discharge, redness, itching and visual disturbance.   Respiratory:  Negative for cough, choking, chest tightness, shortness of breath, wheezing and stridor.    Cardiovascular:  Negative for chest pain, palpitations and leg swelling.   Gastrointestinal:  Negative for abdominal distention, abdominal pain, anal bleeding, blood in stool, constipation, diarrhea, nausea, rectal pain and vomiting.   Endocrine: Negative for cold intolerance, heat intolerance, polydipsia, polyphagia and polyuria.   Genitourinary:  Negative for decreased urine volume, difficulty urinating, dysuria, enuresis, flank pain, frequency, genital sores, hematuria and urgency.   Musculoskeletal:  Positive for arthralgias. Negative for back pain, gait problem, joint swelling, myalgias, neck pain and neck stiffness.   Skin:  Negative for color change, pallor, rash and wound.   Neurological:  Negative for dizziness, tremors, seizures, syncope, facial asymmetry, speech difficulty, weakness, light-headedness, numbness and headaches.    Hematological:  Negative for adenopathy. Does not bruise/bleed easily.   Psychiatric/Behavioral:  Negative for sleep disturbance.        Objective   /76   Pulse 72   Wt 59.7 kg (131 lb 9.6 oz)   BMI 21.57 kg/m²     Physical Exam  Constitutional:       Appearance: Normal appearance.   Cardiovascular:      Rate and Rhythm: Normal rate and regular rhythm.      Heart sounds: No murmur heard.     No gallop.   Pulmonary:      Effort: No respiratory distress.      Breath sounds: No wheezing or rales.   Abdominal:      General: There is no distension.      Palpations: There is no mass.      Tenderness: There is no abdominal tenderness. There is no guarding.   Musculoskeletal:      Right lower leg: No edema.      Left lower leg: No edema.   Neurological:      Mental Status: She is alert.         Assessment/Plan   Diagnoses and all orders for this visit:  Encounter for screening mammogram for malignant neoplasm of breast  -     BI mammo bilateral screening tomosynthesis; Future  Type 2 diabetes mellitus with other specified complication, unspecified whether long term insulin use (Multi)-hemoglobin A1c was 8.2.  Encourage compliance with metformin.  Ophthalmology appointment has been done  -     POCT glycosylated hemoglobin (Hb A1C) manually resulted  -     POCT Fingerstick Glucose manually resulted  Atherosclerosis of native coronary artery of native heart without angina pectoris  Essential hypertension-low-salt diet and exercise  Pure hypercholesterolemia-we will continue with statin  Moderate left ankle sprain, sequela  Abnormal weight loss-part of the weight loss is purposeful.  Encouraged increased p.o. intake.  Health maintenance-colonoscopy has been done.  Immunizations are up-to-date.  Will schedule mammogram.  Eye and dental have been done.  Advance care planning discussed     -

## 2024-07-23 ENCOUNTER — APPOINTMENT (OUTPATIENT)
Dept: PHYSICAL THERAPY | Facility: CLINIC | Age: 79
End: 2024-07-23
Payer: MEDICARE

## 2024-07-24 DIAGNOSIS — I10 ESSENTIAL HYPERTENSION: ICD-10-CM

## 2024-07-25 RX ORDER — CHLORTHALIDONE 25 MG/1
TABLET ORAL
Qty: 90 TABLET | Refills: 2 | Status: SHIPPED | OUTPATIENT
Start: 2024-07-25

## 2024-07-26 ENCOUNTER — APPOINTMENT (OUTPATIENT)
Dept: PHYSICAL THERAPY | Facility: CLINIC | Age: 79
End: 2024-07-26
Payer: MEDICARE

## 2024-07-27 DIAGNOSIS — E11.69 TYPE 2 DIABETES MELLITUS WITH OTHER SPECIFIED COMPLICATION, WITHOUT LONG-TERM CURRENT USE OF INSULIN (MULTI): ICD-10-CM

## 2024-07-29 RX ORDER — GLYBURIDE 5 MG/1
TABLET ORAL
Qty: 120 TABLET | Refills: 2 | Status: SHIPPED | OUTPATIENT
Start: 2024-07-29

## 2024-07-30 ENCOUNTER — APPOINTMENT (OUTPATIENT)
Dept: PHYSICAL THERAPY | Facility: CLINIC | Age: 79
End: 2024-07-30
Payer: MEDICARE

## 2024-07-31 ENCOUNTER — APPOINTMENT (OUTPATIENT)
Dept: OBSTETRICS AND GYNECOLOGY | Facility: HOSPITAL | Age: 79
End: 2024-07-31
Payer: MEDICARE

## 2024-08-02 ENCOUNTER — APPOINTMENT (OUTPATIENT)
Dept: PHYSICAL THERAPY | Facility: CLINIC | Age: 79
End: 2024-08-02
Payer: MEDICARE

## 2024-08-19 ENCOUNTER — APPOINTMENT (OUTPATIENT)
Dept: OBSTETRICS AND GYNECOLOGY | Facility: CLINIC | Age: 79
End: 2024-08-19
Payer: MEDICARE

## 2024-08-21 ENCOUNTER — APPOINTMENT (OUTPATIENT)
Dept: ENDOCRINOLOGY | Facility: CLINIC | Age: 79
End: 2024-08-21
Payer: MEDICARE

## 2024-08-26 DIAGNOSIS — I10 PRIMARY HYPERTENSION: ICD-10-CM

## 2024-08-26 RX ORDER — METOPROLOL TARTRATE 50 MG/1
TABLET ORAL
Qty: 180 TABLET | Refills: 1 | Status: SHIPPED | OUTPATIENT
Start: 2024-08-26

## 2024-08-27 ENCOUNTER — DOCUMENTATION (OUTPATIENT)
Dept: PHYSICAL THERAPY | Facility: CLINIC | Age: 79
End: 2024-08-27
Payer: MEDICARE

## 2024-08-27 NOTE — PROGRESS NOTES
Discharge Summary    Name: Julio Velasquez  MRN: [id number]  : 1945  Date: 24    Discharge Summary: PT    Discharge Information: Date of last visit 24    Therapy Summary: Patient is 79 year old who presented to physical therapy with signs and symptoms consistent with L ankle pain s/p sprain and R great toe pain. Patient had decreased ankle ROM and strength limiting functional mobility and ADLs.     Discharge Status: Same as IE - did not return for follow ups     Rehab Discharge Reason: Failed to schedule and/or keep follow-up appointment(s)

## 2024-09-25 DIAGNOSIS — Z79.4 TYPE 2 DIABETES MELLITUS WITHOUT COMPLICATION, WITH LONG-TERM CURRENT USE OF INSULIN (MULTI): ICD-10-CM

## 2024-09-25 DIAGNOSIS — E11.9 TYPE 2 DIABETES MELLITUS WITHOUT COMPLICATION, WITH LONG-TERM CURRENT USE OF INSULIN (MULTI): ICD-10-CM

## 2024-09-26 ENCOUNTER — TELEPHONE (OUTPATIENT)
Dept: SCHEDULING | Age: 79
End: 2024-09-26
Payer: MEDICARE

## 2024-09-26 DIAGNOSIS — E11.9 TYPE 2 DIABETES MELLITUS WITHOUT COMPLICATION, WITH LONG-TERM CURRENT USE OF INSULIN (MULTI): ICD-10-CM

## 2024-09-26 DIAGNOSIS — Z79.4 TYPE 2 DIABETES MELLITUS WITHOUT COMPLICATION, WITH LONG-TERM CURRENT USE OF INSULIN (MULTI): ICD-10-CM

## 2024-09-26 DIAGNOSIS — E11.69 TYPE 2 DIABETES MELLITUS WITH OTHER SPECIFIED COMPLICATION, WITHOUT LONG-TERM CURRENT USE OF INSULIN: ICD-10-CM

## 2024-09-26 RX ORDER — GLYBURIDE 5 MG/1
TABLET ORAL
Qty: 360 TABLET | Refills: 3 | Status: SHIPPED | OUTPATIENT
Start: 2024-09-26

## 2024-09-26 NOTE — TELEPHONE ENCOUNTER
Dr. Cardenas just signed for a year supply of this medication. This is completed.    Pt. O2 increased to 4L NC.

## 2024-10-14 ENCOUNTER — APPOINTMENT (OUTPATIENT)
Dept: OBSTETRICS AND GYNECOLOGY | Facility: CLINIC | Age: 79
End: 2024-10-14
Payer: MEDICARE

## 2024-10-22 ENCOUNTER — APPOINTMENT (OUTPATIENT)
Dept: PRIMARY CARE | Facility: CLINIC | Age: 79
End: 2024-10-22
Payer: MEDICARE

## 2024-10-22 VITALS
DIASTOLIC BLOOD PRESSURE: 80 MMHG | BODY MASS INDEX: 22.12 KG/M2 | WEIGHT: 135 LBS | HEART RATE: 80 BPM | SYSTOLIC BLOOD PRESSURE: 136 MMHG

## 2024-10-22 DIAGNOSIS — I10 ESSENTIAL HYPERTENSION: ICD-10-CM

## 2024-10-22 DIAGNOSIS — E11.69 TYPE 2 DIABETES MELLITUS WITH OTHER SPECIFIED COMPLICATION, UNSPECIFIED WHETHER LONG TERM INSULIN USE (MULTI): Primary | ICD-10-CM

## 2024-10-22 DIAGNOSIS — I25.10 ATHEROSCLEROSIS OF NATIVE CORONARY ARTERY OF NATIVE HEART WITHOUT ANGINA PECTORIS: ICD-10-CM

## 2024-10-22 DIAGNOSIS — Z23 NEED FOR INFLUENZA VACCINATION: ICD-10-CM

## 2024-10-22 DIAGNOSIS — E78.00 PURE HYPERCHOLESTEROLEMIA: ICD-10-CM

## 2024-10-22 LAB — POC HEMOGLOBIN A1C: 7.7 % (ref 4.2–6.5)

## 2024-10-22 PROCEDURE — 3075F SYST BP GE 130 - 139MM HG: CPT | Performed by: INTERNAL MEDICINE

## 2024-10-22 PROCEDURE — 83036 HEMOGLOBIN GLYCOSYLATED A1C: CPT | Performed by: INTERNAL MEDICINE

## 2024-10-22 PROCEDURE — G0008 ADMIN INFLUENZA VIRUS VAC: HCPCS | Performed by: INTERNAL MEDICINE

## 2024-10-22 PROCEDURE — G2211 COMPLEX E/M VISIT ADD ON: HCPCS | Performed by: INTERNAL MEDICINE

## 2024-10-22 PROCEDURE — 99214 OFFICE O/P EST MOD 30 MIN: CPT | Performed by: INTERNAL MEDICINE

## 2024-10-22 PROCEDURE — 1160F RVW MEDS BY RX/DR IN RCRD: CPT | Performed by: INTERNAL MEDICINE

## 2024-10-22 PROCEDURE — 90662 IIV NO PRSV INCREASED AG IM: CPT | Performed by: INTERNAL MEDICINE

## 2024-10-22 PROCEDURE — 1124F ACP DISCUSS-NO DSCNMKR DOCD: CPT | Performed by: INTERNAL MEDICINE

## 2024-10-22 PROCEDURE — 3079F DIAST BP 80-89 MM HG: CPT | Performed by: INTERNAL MEDICINE

## 2024-10-22 PROCEDURE — 1159F MED LIST DOCD IN RCRD: CPT | Performed by: INTERNAL MEDICINE

## 2024-10-22 ASSESSMENT — ENCOUNTER SYMPTOMS
PALPITATIONS: 0
DIAPHORESIS: 0
ABDOMINAL PAIN: 0
SEIZURES: 0
EYE DISCHARGE: 0
WOUND: 0
BACK PAIN: 1
WEAKNESS: 0
POLYPHAGIA: 0
NECK PAIN: 0
EYE PAIN: 0
COUGH: 0
PHOTOPHOBIA: 0
NUMBNESS: 0
VOMITING: 0
FATIGUE: 0
NAUSEA: 0
MYALGIAS: 0
FACIAL ASYMMETRY: 0
TROUBLE SWALLOWING: 0
FACIAL SWELLING: 0
WHEEZING: 0
ADENOPATHY: 0
HEMATURIA: 0
SHORTNESS OF BREATH: 0
POLYDIPSIA: 0
ANAL BLEEDING: 0
DIARRHEA: 0
ABDOMINAL DISTENTION: 0
JOINT SWELLING: 0
EYE REDNESS: 0
ARTHRALGIAS: 0
STRIDOR: 0
SPEECH DIFFICULTY: 0
COLOR CHANGE: 0
SINUS PRESSURE: 0
CHILLS: 0
ACTIVITY CHANGE: 0
SLEEP DISTURBANCE: 0
HEADACHES: 0
VOICE CHANGE: 0
DIZZINESS: 0
RECTAL PAIN: 0
DYSURIA: 0
SORE THROAT: 0
APPETITE CHANGE: 0
LIGHT-HEADEDNESS: 0
EYE ITCHING: 0
NECK STIFFNESS: 0
BRUISES/BLEEDS EASILY: 0
TREMORS: 0
CHEST TIGHTNESS: 0
DIFFICULTY URINATING: 0
FLANK PAIN: 0
CONSTIPATION: 0
RHINORRHEA: 1
SINUS PAIN: 0
CHOKING: 0
FREQUENCY: 0
BLOOD IN STOOL: 0

## 2024-10-22 ASSESSMENT — PATIENT HEALTH QUESTIONNAIRE - PHQ9
1. LITTLE INTEREST OR PLEASURE IN DOING THINGS: NOT AT ALL
2. FEELING DOWN, DEPRESSED OR HOPELESS: NOT AT ALL
SUM OF ALL RESPONSES TO PHQ9 QUESTIONS 1 AND 2: 0

## 2024-10-22 NOTE — PROGRESS NOTES
Subjective   Patient ID: Julio Dutta [place] is a 79 y.o. female who presents for Follow-up.    Patient presents for follow-up.  She has been compliant with her medications, diet but not exercise.  She reports baseline back pain.  She denies any headaches, no dizziness, but does complain of allergy symptoms.  She denies any chest pain or shortness of breath, no abdominal pain no nausea vomiting or diarrhea.  She reports no other new musculoskeletal complaints.         Review of Systems   Constitutional:  Negative for activity change, appetite change, chills, diaphoresis and fatigue.   HENT:  Positive for congestion, postnasal drip and rhinorrhea. Negative for dental problem, drooling, ear discharge, ear pain, facial swelling, hearing loss, mouth sores, nosebleeds, sinus pressure, sinus pain, sneezing, sore throat, tinnitus, trouble swallowing and voice change.    Eyes:  Negative for photophobia, pain, discharge, redness, itching and visual disturbance.   Respiratory:  Negative for cough, choking, chest tightness, shortness of breath, wheezing and stridor.    Cardiovascular:  Negative for chest pain, palpitations and leg swelling.   Gastrointestinal:  Negative for abdominal distention, abdominal pain, anal bleeding, blood in stool, constipation, diarrhea, nausea, rectal pain and vomiting.   Endocrine: Negative for cold intolerance, heat intolerance, polydipsia, polyphagia and polyuria.   Genitourinary:  Negative for decreased urine volume, difficulty urinating, dysuria, enuresis, flank pain, frequency, genital sores, hematuria and urgency.   Musculoskeletal:  Positive for back pain. Negative for arthralgias, gait problem, joint swelling, myalgias, neck pain and neck stiffness.   Skin:  Negative for color change, pallor, rash and wound.   Neurological:  Negative for dizziness, tremors, seizures, syncope, facial asymmetry, speech difficulty, weakness, light-headedness, numbness and headaches.   Hematological:   Negative for adenopathy. Does not bruise/bleed easily.   Psychiatric/Behavioral:  Negative for sleep disturbance.        Objective   /80   Pulse 80   Wt 61.2 kg (135 lb)   BMI 22.12 kg/m²     Physical Exam  Constitutional:       Appearance: Normal appearance.   Cardiovascular:      Rate and Rhythm: Normal rate and regular rhythm.      Heart sounds: No murmur heard.     No gallop.   Pulmonary:      Effort: No respiratory distress.      Breath sounds: No wheezing or rales.   Abdominal:      General: There is no distension.      Palpations: There is no mass.      Tenderness: There is no abdominal tenderness. There is no guarding.   Musculoskeletal:      Right lower leg: No edema.      Left lower leg: No edema.   Neurological:      Mental Status: She is alert.         Assessment/Plan   Diagnoses and all orders for this visit:  Type 2 diabetes mellitus with other specified complication, unspecified whether long term insulin use (Multi)-hemoglobin A1c was 7.7.  Improved.  Follow with endocrinology.  Ophthalmology appointment has been done  -     POCT glycosylated hemoglobin (Hb A1C) manually resulted  Need for influenza vaccination  -     Flu vaccine, trivalent, preservative free, HIGH-DOSE, age 65y+ (Fluzone)  Atherosclerosis of native coronary artery of native heart without angina pectoris-modify risk factors.  Follow-up with cardiology  Essential hypertension-low-salt diet and exercise  Pure hypercholesterolemia-recheck lipids at next visit.  Back pain-stable symptoms.  Health maintenance-will give a flu shot today.  Colonoscopy has been done.  She will get the RSV and COVID-vaccine at the pharmacy.  Mammogram has been done.

## 2024-10-27 DIAGNOSIS — I10 ESSENTIAL HYPERTENSION: ICD-10-CM

## 2024-10-28 RX ORDER — RAMIPRIL 10 MG/1
CAPSULE ORAL
Qty: 90 CAPSULE | Refills: 3 | Status: SHIPPED | OUTPATIENT
Start: 2024-10-28

## 2024-11-25 DIAGNOSIS — E78.5 HYPERLIPIDEMIA, UNSPECIFIED HYPERLIPIDEMIA TYPE: ICD-10-CM

## 2024-11-26 RX ORDER — ATORVASTATIN CALCIUM 80 MG/1
TABLET, FILM COATED ORAL
Qty: 90 TABLET | Refills: 3 | Status: SHIPPED | OUTPATIENT
Start: 2024-11-26

## 2024-12-05 ENCOUNTER — TELEPHONE (OUTPATIENT)
Dept: PRIMARY CARE | Facility: CLINIC | Age: 79
End: 2024-12-05
Payer: MEDICARE

## 2024-12-05 DIAGNOSIS — M25.551 PAIN IN RIGHT HIP: ICD-10-CM

## 2025-01-22 ENCOUNTER — APPOINTMENT (OUTPATIENT)
Dept: PRIMARY CARE | Facility: CLINIC | Age: 80
End: 2025-01-22
Payer: MEDICARE

## 2025-01-22 DIAGNOSIS — E78.2 MIXED HYPERLIPIDEMIA: ICD-10-CM

## 2025-01-22 DIAGNOSIS — E55.9 VITAMIN D DEFICIENCY: ICD-10-CM

## 2025-01-22 DIAGNOSIS — I10 ESSENTIAL HYPERTENSION: ICD-10-CM

## 2025-01-22 DIAGNOSIS — E11.69 TYPE 2 DIABETES MELLITUS WITH OTHER SPECIFIED COMPLICATION, UNSPECIFIED WHETHER LONG TERM INSULIN USE (MULTI): Primary | ICD-10-CM

## 2025-01-22 DIAGNOSIS — I25.10 ATHEROSCLEROSIS OF NATIVE CORONARY ARTERY OF NATIVE HEART WITHOUT ANGINA PECTORIS: ICD-10-CM

## 2025-01-22 DIAGNOSIS — R53.81 MALAISE: ICD-10-CM

## 2025-01-22 PROCEDURE — 1158F ADVNC CARE PLAN TLK DOCD: CPT | Performed by: INTERNAL MEDICINE

## 2025-01-22 PROCEDURE — G2211 COMPLEX E/M VISIT ADD ON: HCPCS | Performed by: INTERNAL MEDICINE

## 2025-01-22 PROCEDURE — 1123F ACP DISCUSS/DSCN MKR DOCD: CPT | Performed by: INTERNAL MEDICINE

## 2025-01-22 PROCEDURE — 1160F RVW MEDS BY RX/DR IN RCRD: CPT | Performed by: INTERNAL MEDICINE

## 2025-01-22 PROCEDURE — 1036F TOBACCO NON-USER: CPT | Performed by: INTERNAL MEDICINE

## 2025-01-22 PROCEDURE — 99213 OFFICE O/P EST LOW 20 MIN: CPT | Performed by: INTERNAL MEDICINE

## 2025-01-22 PROCEDURE — 1159F MED LIST DOCD IN RCRD: CPT | Performed by: INTERNAL MEDICINE

## 2025-01-22 ASSESSMENT — ENCOUNTER SYMPTOMS
FLANK PAIN: 0
NAUSEA: 0
WHEEZING: 0
NECK PAIN: 0
EYE DISCHARGE: 0
LIGHT-HEADEDNESS: 0
ABDOMINAL PAIN: 0
TREMORS: 0
VOICE CHANGE: 0
BRUISES/BLEEDS EASILY: 0
STRIDOR: 0
WEAKNESS: 0
DIFFICULTY URINATING: 0
FACIAL SWELLING: 0
BACK PAIN: 0
SINUS PRESSURE: 0
SEIZURES: 0
COLOR CHANGE: 0
CHEST TIGHTNESS: 0
FACIAL ASYMMETRY: 0
DIARRHEA: 0
FREQUENCY: 0
NUMBNESS: 0
RHINORRHEA: 0
JOINT SWELLING: 0
SORE THROAT: 0
ABDOMINAL DISTENTION: 0
EYE ITCHING: 0
ACTIVITY CHANGE: 0
DIAPHORESIS: 0
SLEEP DISTURBANCE: 0
ARTHRALGIAS: 0
APPETITE CHANGE: 0
HEADACHES: 0
CONSTIPATION: 0
ADENOPATHY: 0
VOMITING: 0
PHOTOPHOBIA: 0
SHORTNESS OF BREATH: 0
PALPITATIONS: 0
EYE PAIN: 0
COUGH: 0
POLYPHAGIA: 0
HEMATURIA: 0
BLOOD IN STOOL: 0
DYSURIA: 0
WOUND: 0
RECTAL PAIN: 0
MYALGIAS: 0
CHILLS: 0
POLYDIPSIA: 0
CHOKING: 0
SINUS PAIN: 0
FATIGUE: 0
ANAL BLEEDING: 0
EYE REDNESS: 0
DIZZINESS: 0
TROUBLE SWALLOWING: 0
NECK STIFFNESS: 0
SPEECH DIFFICULTY: 0

## 2025-01-22 NOTE — PROGRESS NOTES
Subjective   Patient ID: Julio Dutta [place] is a 79 y.o. female who presents for No chief complaint on file..    Patient presents for follow-up.  She has been compliant with her medications, and diet.  She reports baseline arthritis symptoms.  She denies any headaches, no dizziness, no chest pain or shortness of breath.  She denies abdominal pain no nausea vomiting or diarrhea.         Review of Systems   Constitutional:  Negative for activity change, appetite change, chills, diaphoresis and fatigue.   HENT:  Negative for congestion, dental problem, drooling, ear discharge, ear pain, facial swelling, hearing loss, mouth sores, nosebleeds, postnasal drip, rhinorrhea, sinus pressure, sinus pain, sneezing, sore throat, tinnitus, trouble swallowing and voice change.    Eyes:  Negative for photophobia, pain, discharge, redness, itching and visual disturbance.   Respiratory:  Negative for cough, choking, chest tightness, shortness of breath, wheezing and stridor.    Cardiovascular:  Negative for chest pain, palpitations and leg swelling.   Gastrointestinal:  Negative for abdominal distention, abdominal pain, anal bleeding, blood in stool, constipation, diarrhea, nausea, rectal pain and vomiting.   Endocrine: Negative for cold intolerance, heat intolerance, polydipsia, polyphagia and polyuria.   Genitourinary:  Negative for decreased urine volume, difficulty urinating, dysuria, enuresis, flank pain, frequency, genital sores, hematuria and urgency.   Musculoskeletal:  Negative for arthralgias, back pain, gait problem, joint swelling, myalgias, neck pain and neck stiffness.   Skin:  Negative for color change, pallor, rash and wound.   Neurological:  Negative for dizziness, tremors, seizures, syncope, facial asymmetry, speech difficulty, weakness, light-headedness, numbness and headaches.   Hematological:  Negative for adenopathy. Does not bruise/bleed easily.   Psychiatric/Behavioral:  Negative for sleep  disturbance.        Objective   There were no vitals taken for this visit.    Physical Exam  Constitutional:       Appearance: Normal appearance.   Pulmonary:      Effort: Pulmonary effort is normal.   Neurological:      Mental Status: She is alert.         Assessment/Plan   Diagnoses and all orders for this visit:  Type 2 diabetes mellitus with other specified complication, unspecified whether long term insulin use (Multi)-will check hemoglobin A1c.  Ophthalmology appointment has been done.  -     Hemoglobin A1c; Future  Malaise  -     CBC and Auto Differential; Future  -     TSH with reflex to Free T4 if abnormal; Future  -     Hepatitis C antibody; Future  Essential hypertension-schedule appointment for blood pressure check.  -     Uric Acid; Future-  -     Urinalysis with Reflex Microscopic; Future  -     Albumin-Creatinine Ratio, Urine Random; Future  -     Comprehensive Metabolic Panel; Future  Vitamin D deficiency  -     Vitamin D 25-Hydroxy,Total (for eval of Vitamin D levels); Future  Mixed hyperlipidemia-check a lipid profile  -     Lipid Panel; Future  Atherosclerosis of native coronary artery of native heart without angina pectoris-modify risk factors.  Follow-up with cardiology.  Health maintenance-she will schedule mammogram.  She will get a COVID and a tetanus shot at the pharmacy.  Will obtain fasting blood work.  Eye and dental appointments are pending.  Advance care planning discussed.

## 2025-01-22 NOTE — PATIENT INSTRUCTIONS
Please take medication as prescribed.  Obtain fasting blood work and urine.  Follow-up in 6 weeks..  Schedule your mammogram and get your immunizations done.

## 2025-02-28 ENCOUNTER — OFFICE VISIT (OUTPATIENT)
Dept: PRIMARY CARE | Facility: CLINIC | Age: 80
End: 2025-02-28
Payer: MEDICARE

## 2025-02-28 VITALS
WEIGHT: 135 LBS | BODY MASS INDEX: 22.12 KG/M2 | SYSTOLIC BLOOD PRESSURE: 118 MMHG | HEART RATE: 68 BPM | DIASTOLIC BLOOD PRESSURE: 72 MMHG

## 2025-02-28 DIAGNOSIS — I10 ESSENTIAL HYPERTENSION: Primary | ICD-10-CM

## 2025-02-28 DIAGNOSIS — E78.00 PURE HYPERCHOLESTEROLEMIA: ICD-10-CM

## 2025-02-28 DIAGNOSIS — D35.2 PITUITARY BENIGN NEOPLASM (MULTI): ICD-10-CM

## 2025-02-28 DIAGNOSIS — I25.10 ATHEROSCLEROSIS OF NATIVE CORONARY ARTERY OF NATIVE HEART WITHOUT ANGINA PECTORIS: ICD-10-CM

## 2025-02-28 DIAGNOSIS — S39.012A STRAIN, BACK, INITIAL ENCOUNTER: ICD-10-CM

## 2025-02-28 PROCEDURE — 3074F SYST BP LT 130 MM HG: CPT | Performed by: INTERNAL MEDICINE

## 2025-02-28 PROCEDURE — G2211 COMPLEX E/M VISIT ADD ON: HCPCS | Performed by: INTERNAL MEDICINE

## 2025-02-28 PROCEDURE — 1036F TOBACCO NON-USER: CPT | Performed by: INTERNAL MEDICINE

## 2025-02-28 PROCEDURE — 1124F ACP DISCUSS-NO DSCNMKR DOCD: CPT | Performed by: INTERNAL MEDICINE

## 2025-02-28 PROCEDURE — 1160F RVW MEDS BY RX/DR IN RCRD: CPT | Performed by: INTERNAL MEDICINE

## 2025-02-28 PROCEDURE — 1159F MED LIST DOCD IN RCRD: CPT | Performed by: INTERNAL MEDICINE

## 2025-02-28 PROCEDURE — 3078F DIAST BP <80 MM HG: CPT | Performed by: INTERNAL MEDICINE

## 2025-02-28 PROCEDURE — 1126F AMNT PAIN NOTED NONE PRSNT: CPT | Performed by: INTERNAL MEDICINE

## 2025-02-28 PROCEDURE — 99214 OFFICE O/P EST MOD 30 MIN: CPT | Performed by: INTERNAL MEDICINE

## 2025-02-28 ASSESSMENT — ENCOUNTER SYMPTOMS
SLEEP DISTURBANCE: 0
COLOR CHANGE: 0
HEMATURIA: 0
VOICE CHANGE: 0
PHOTOPHOBIA: 0
SINUS PAIN: 0
ABDOMINAL DISTENTION: 0
SINUS PRESSURE: 0
ANAL BLEEDING: 0
BLOOD IN STOOL: 0
EYE DISCHARGE: 0
NUMBNESS: 1
TREMORS: 0
DYSURIA: 0
HEADACHES: 0
COUGH: 0
SORE THROAT: 0
RECTAL PAIN: 0
DIAPHORESIS: 0
ABDOMINAL PAIN: 0
FACIAL ASYMMETRY: 0
TROUBLE SWALLOWING: 0
WHEEZING: 0
ARTHRALGIAS: 0
CHILLS: 0
ADENOPATHY: 0
POLYPHAGIA: 0
EYE PAIN: 0
NECK STIFFNESS: 0
BRUISES/BLEEDS EASILY: 0
CHEST TIGHTNESS: 0
LIGHT-HEADEDNESS: 0
DIFFICULTY URINATING: 0
EYE REDNESS: 0
FACIAL SWELLING: 0
WEAKNESS: 0
FREQUENCY: 0
EYE ITCHING: 0
BACK PAIN: 0
SEIZURES: 0
APPETITE CHANGE: 0
POLYDIPSIA: 0
SPEECH DIFFICULTY: 0
JOINT SWELLING: 0
NECK PAIN: 0
STRIDOR: 0
VOMITING: 0
NAUSEA: 0
DIZZINESS: 0
WOUND: 0
CHOKING: 0
CONSTIPATION: 0
RHINORRHEA: 0
FATIGUE: 0
ACTIVITY CHANGE: 0
FLANK PAIN: 0
SHORTNESS OF BREATH: 0
PALPITATIONS: 0
DIARRHEA: 0
MYALGIAS: 0

## 2025-02-28 ASSESSMENT — PATIENT HEALTH QUESTIONNAIRE - PHQ9
2. FEELING DOWN, DEPRESSED OR HOPELESS: NOT AT ALL
1. LITTLE INTEREST OR PLEASURE IN DOING THINGS: NOT AT ALL
SUM OF ALL RESPONSES TO PHQ9 QUESTIONS 1 AND 2: 0

## 2025-02-28 ASSESSMENT — PAIN SCALES - GENERAL: PAINLEVEL_OUTOF10: 0-NO PAIN

## 2025-02-28 NOTE — PROGRESS NOTES
Subjective   Patient ID: Julio Dutta Sheldon is a 79 y.o. female who presents for Follow-up.    Patient presents for follow-up.  She has been compliant with her medications but not diet or exercise.  She overall feels well.  She denies any headaches, no dizziness, no chest pain or shortness of breath.  She denies abdominal pain no nausea vomiting or diarrhea.  She reports no pain.  She does complain of occasional carpal tunnel symptoms.         Review of Systems   Constitutional:  Negative for activity change, appetite change, chills, diaphoresis and fatigue.   HENT:  Negative for congestion, dental problem, drooling, ear discharge, ear pain, facial swelling, hearing loss, mouth sores, nosebleeds, postnasal drip, rhinorrhea, sinus pressure, sinus pain, sneezing, sore throat, tinnitus, trouble swallowing and voice change.    Eyes:  Negative for photophobia, pain, discharge, redness, itching and visual disturbance.   Respiratory:  Negative for cough, choking, chest tightness, shortness of breath, wheezing and stridor.    Cardiovascular:  Negative for chest pain, palpitations and leg swelling.   Gastrointestinal:  Negative for abdominal distention, abdominal pain, anal bleeding, blood in stool, constipation, diarrhea, nausea, rectal pain and vomiting.   Endocrine: Negative for cold intolerance, heat intolerance, polydipsia, polyphagia and polyuria.   Genitourinary:  Negative for decreased urine volume, difficulty urinating, dysuria, enuresis, flank pain, frequency, genital sores, hematuria and urgency.   Musculoskeletal:  Negative for arthralgias, back pain, gait problem, joint swelling, myalgias, neck pain and neck stiffness.   Skin:  Negative for color change, pallor, rash and wound.   Neurological:  Positive for numbness. Negative for dizziness, tremors, seizures, syncope, facial asymmetry, speech difficulty, weakness, light-headedness and headaches.   Hematological:  Negative for adenopathy. Does not  bruise/bleed easily.   Psychiatric/Behavioral:  Negative for sleep disturbance.        Objective   /72   Pulse 68   Wt 61.2 kg (135 lb)   BMI 22.12 kg/m²     Physical Exam  Constitutional:       Appearance: Normal appearance.   Cardiovascular:      Rate and Rhythm: Normal rate and regular rhythm.      Heart sounds: No murmur heard.     No gallop.   Pulmonary:      Effort: No respiratory distress.      Breath sounds: No wheezing or rales.   Abdominal:      General: There is no distension.      Palpations: There is no mass.      Tenderness: There is no abdominal tenderness. There is no guarding.   Musculoskeletal:      Right lower leg: No edema.      Left lower leg: No edema.   Neurological:      Mental Status: She is alert.         Assessment/Plan   Diagnoses and all orders for this visit:  Essential hypertension-low-salt diet and exercise  Pituitary benign neoplasm (Multi)-stable.  Pure hypercholesterolemia-lipid profile done today.  Atherosclerosis of native coronary artery of native heart without angina pectoris-modify risk factors.  Follow-up with cardiology.  Strain, back, initial encounter  Diabetes-hemoglobin A1c was 8.0.  She will follow-up with endocrinology.  She will watch her diet.  Take medication as prescribed.  Optimize appointment has been done.  Health maintenance-she will schedule her mammogram.  Colonoscopy has been done.  She will get her COVID-vaccine at the pharmacy

## 2025-02-28 NOTE — PATIENT INSTRUCTIONS
Please schedule your mammogram.  Diet and exercise.  Schedule appointment with your endocrinologist.  Follow-up in 3 months.

## 2025-03-01 LAB
25(OH)D3+25(OH)D2 SERPL-MCNC: 41 NG/ML (ref 30–100)
ALBUMIN SERPL-MCNC: 4.5 G/DL (ref 3.6–5.1)
ALP SERPL-CCNC: 148 U/L (ref 37–153)
ALT SERPL-CCNC: 36 U/L (ref 6–29)
ANION GAP SERPL CALCULATED.4IONS-SCNC: 9 MMOL/L (CALC) (ref 7–17)
APPEARANCE UR: CLEAR
AST SERPL-CCNC: 37 U/L (ref 10–35)
BASOPHILS # BLD AUTO: 38 CELLS/UL (ref 0–200)
BASOPHILS NFR BLD AUTO: 0.8 %
BILIRUB SERPL-MCNC: 0.6 MG/DL (ref 0.2–1.2)
BILIRUB UR QL STRIP: NEGATIVE
BUN SERPL-MCNC: 16 MG/DL (ref 7–25)
CALCIUM SERPL-MCNC: 10 MG/DL (ref 8.6–10.4)
CHLORIDE SERPL-SCNC: 95 MMOL/L (ref 98–110)
CHOLEST SERPL-MCNC: 121 MG/DL
CHOLEST/HDLC SERPL: 1.8 (CALC)
CO2 SERPL-SCNC: 33 MMOL/L (ref 20–32)
COLOR UR: YELLOW
CREAT SERPL-MCNC: 0.77 MG/DL (ref 0.6–1)
EGFRCR SERPLBLD CKD-EPI 2021: 79 ML/MIN/1.73M2
EOSINOPHIL # BLD AUTO: 240 CELLS/UL (ref 15–500)
EOSINOPHIL NFR BLD AUTO: 5 %
ERYTHROCYTE [DISTWIDTH] IN BLOOD BY AUTOMATED COUNT: 12 % (ref 11–15)
EST. AVERAGE GLUCOSE BLD GHB EST-MCNC: 192 MG/DL
EST. AVERAGE GLUCOSE BLD GHB EST-SCNC: 10.6 MMOL/L
GLUCOSE SERPL-MCNC: 142 MG/DL (ref 65–99)
GLUCOSE UR QL STRIP: ABNORMAL
HBA1C MFR BLD: 8.3 % OF TOTAL HGB
HCT VFR BLD AUTO: 44 % (ref 35–45)
HCV AB SERPL QL IA: NORMAL
HDLC SERPL-MCNC: 68 MG/DL
HGB BLD-MCNC: 14.3 G/DL (ref 11.7–15.5)
HGB UR QL STRIP: NEGATIVE
KETONES UR QL STRIP: NEGATIVE
LDLC SERPL CALC-MCNC: 40 MG/DL (CALC)
LEUKOCYTE ESTERASE UR QL STRIP: NEGATIVE
LYMPHOCYTES # BLD AUTO: 2285 CELLS/UL (ref 850–3900)
LYMPHOCYTES NFR BLD AUTO: 47.6 %
MCH RBC QN AUTO: 30.6 PG (ref 27–33)
MCHC RBC AUTO-ENTMCNC: 32.5 G/DL (ref 32–36)
MCV RBC AUTO: 94 FL (ref 80–100)
MONOCYTES # BLD AUTO: 480 CELLS/UL (ref 200–950)
MONOCYTES NFR BLD AUTO: 10 %
NEUTROPHILS # BLD AUTO: 1757 CELLS/UL (ref 1500–7800)
NEUTROPHILS NFR BLD AUTO: 36.6 %
NITRITE UR QL STRIP: NEGATIVE
NONHDLC SERPL-MCNC: 53 MG/DL (CALC)
PH UR STRIP: 7 [PH] (ref 5–8)
PLATELET # BLD AUTO: 245 THOUSAND/UL (ref 140–400)
PMV BLD REES-ECKER: 11.5 FL (ref 7.5–12.5)
POTASSIUM SERPL-SCNC: 3.9 MMOL/L (ref 3.5–5.3)
PROT SERPL-MCNC: 7.4 G/DL (ref 6.1–8.1)
PROT UR QL STRIP: NEGATIVE
RBC # BLD AUTO: 4.68 MILLION/UL (ref 3.8–5.1)
SODIUM SERPL-SCNC: 137 MMOL/L (ref 135–146)
SP GR UR STRIP: 1.03 (ref 1–1.03)
TRIGL SERPL-MCNC: 44 MG/DL
TSH SERPL-ACNC: 1.98 MIU/L (ref 0.4–4.5)
URATE SERPL-MCNC: 4.3 MG/DL (ref 2.5–7)
WBC # BLD AUTO: 4.8 THOUSAND/UL (ref 3.8–10.8)

## 2025-03-13 DIAGNOSIS — I10 PRIMARY HYPERTENSION: ICD-10-CM

## 2025-03-13 RX ORDER — METOPROLOL TARTRATE 50 MG/1
TABLET ORAL
Qty: 180 TABLET | Refills: 3 | Status: SHIPPED | OUTPATIENT
Start: 2025-03-13

## 2025-03-20 ENCOUNTER — HOSPITAL ENCOUNTER (OUTPATIENT)
Dept: RADIOLOGY | Facility: CLINIC | Age: 80
Discharge: HOME | End: 2025-03-20
Payer: MEDICARE

## 2025-03-20 VITALS — BODY MASS INDEX: 21.69 KG/M2 | HEIGHT: 66 IN | WEIGHT: 135 LBS

## 2025-03-20 DIAGNOSIS — Z12.31 ENCOUNTER FOR SCREENING MAMMOGRAM FOR MALIGNANT NEOPLASM OF BREAST: ICD-10-CM

## 2025-03-20 PROCEDURE — 77063 BREAST TOMOSYNTHESIS BI: CPT

## 2025-03-25 ENCOUNTER — HOSPITAL ENCOUNTER (OUTPATIENT)
Dept: RADIOLOGY | Facility: EXTERNAL LOCATION | Age: 80
Discharge: HOME | End: 2025-03-25

## 2025-04-09 ENCOUNTER — OFFICE VISIT (OUTPATIENT)
Dept: CARDIOLOGY | Facility: CLINIC | Age: 80
End: 2025-04-09
Payer: MEDICARE

## 2025-04-09 VITALS
WEIGHT: 134.4 LBS | HEART RATE: 60 BPM | DIASTOLIC BLOOD PRESSURE: 62 MMHG | OXYGEN SATURATION: 95 % | BODY MASS INDEX: 21.6 KG/M2 | SYSTOLIC BLOOD PRESSURE: 99 MMHG | HEIGHT: 66 IN

## 2025-04-09 DIAGNOSIS — I25.2 HISTORY OF MYOCARDIAL INFARCTION: ICD-10-CM

## 2025-04-09 DIAGNOSIS — I25.10 ATHEROSCLEROSIS OF NATIVE CORONARY ARTERY OF NATIVE HEART WITHOUT ANGINA PECTORIS: Primary | ICD-10-CM

## 2025-04-09 PROCEDURE — 1126F AMNT PAIN NOTED NONE PRSNT: CPT | Performed by: INTERNAL MEDICINE

## 2025-04-09 PROCEDURE — 1160F RVW MEDS BY RX/DR IN RCRD: CPT | Performed by: INTERNAL MEDICINE

## 2025-04-09 PROCEDURE — 3074F SYST BP LT 130 MM HG: CPT | Performed by: INTERNAL MEDICINE

## 2025-04-09 PROCEDURE — 1036F TOBACCO NON-USER: CPT | Performed by: INTERNAL MEDICINE

## 2025-04-09 PROCEDURE — 3078F DIAST BP <80 MM HG: CPT | Performed by: INTERNAL MEDICINE

## 2025-04-09 PROCEDURE — 99214 OFFICE O/P EST MOD 30 MIN: CPT | Performed by: INTERNAL MEDICINE

## 2025-04-09 PROCEDURE — 1159F MED LIST DOCD IN RCRD: CPT | Performed by: INTERNAL MEDICINE

## 2025-04-09 PROCEDURE — G2211 COMPLEX E/M VISIT ADD ON: HCPCS | Performed by: INTERNAL MEDICINE

## 2025-04-09 RX ORDER — NITROGLYCERIN 0.4 MG/1
0.4 TABLET SUBLINGUAL EVERY 5 MIN PRN
Qty: 25 TABLET | Refills: 2 | Status: SHIPPED | OUTPATIENT
Start: 2025-04-09

## 2025-04-09 ASSESSMENT — PAIN SCALES - GENERAL: PAINLEVEL_OUTOF10: 0-NO PAIN

## 2025-04-09 NOTE — PROGRESS NOTES
Primary Care Physician: Jono Cardenas MD  Date of Visit: 2025  3:20 PM EDT  Location of visit: [facility] [place] [address]   Last office visit: 2024    Chief Complaint:     Follow-up CAD    HPI/Summary  Julio Velasquez is a 80 y.o. female who presents for followup cardiology evaluation.     The patient is status post bypass surgery in .  The problem list includes diabetes and hypertension.  In , she presented with chest pain, troponin was minimally elevated, and she was treated conservatively.  Ticagrelor was added.  Catheterization was not pursued.  A stress test on 2020 showed no evidence for ischemia or scar, with an ejection fraction of 68%.     She remains on low-dose aspirin, atorvastatin 80 mg daily, chlorthalidone 25 mg daily, Jardiance 25 mg daily, glyburide 5 mg twice daily, metformin 500 mg daily, metoprolol 50 mg twice daily and ramipril 10 mg daily.    Recent laboratory review: Hemoglobin A1c 8.3% 1 month ago.  Creatinine 0.77.  Cholesterol 121, HDL 68, LDL 40 triglycerides 44.    Chair exercises. Walks outdoors. 15 minutes in the morning and evening.  No angina.  Compliant with all medications.  Specialty Problems          Cardiology Problems    ACS (acute coronary syndrome) (Multi)    Atherosclerotic heart disease of native coronary artery without angina pectoris    Elevated troponin    Essential hypertension    Hyperlipidemia    Nicotine dependence in remission    Hx of CABG    History of myocardial infarction      Social History     Tobacco Use    Smoking status: Former     Current packs/day: 0.00     Average packs/day: 1 pack/day for 20.0 years (20.0 ttl pk-yrs)     Types: Cigarettes     Start date:      Quit date: 2005     Years since quittin.2    Smokeless tobacco: Never   Substance Use Topics    Alcohol use: Never    Drug use: Never      No Known Allergies  Current Outpatient Medications   Medication Instructions    ascorbic acid, vitamin C, 500 mg  "capsule 1 capsule, Daily    aspirin 81 mg, Daily RT    atorvastatin (Lipitor) 80 mg tablet TAKE 1 TABLET BY MOUTH DAILY    chlorthalidone (Hygroton) 25 mg tablet TAKE 1 TABLET BY MOUTH EVERY DAY    cholecalciferol (VITAMIN D-3) 250 mcg, Daily    cyanocobalamin (Vitamin B-12) 1,000 mcg tablet 1 tablet, Daily    diphenhydrAMINE (BENADRYL ALLERGY) 25 mg, Every 6 hours PRN    empagliflozin (JARDIANCE) 25 mg, oral, Daily    FreeStyle Lite Strips strip TEST THREE TIMES DAILY    gabapentin (NEURONTIN) 300 mg, Nightly    glyBURIDE (Diabeta) 5 mg tablet TAKE 2 TABLETS BY MOUTH TWICE DAILY BEFORE MEALS    metFORMIN XR (GLUCOPHAGE-XR) 500 mg, Daily with evening meal    metoprolol tartrate (Lopressor) 50 mg tablet TAKE 1 TABLET BY MOUTH TWICE DAILY WITH MEALS    nitroglycerin (NITROSTAT) 0.4 mg, sublingual, Every 5 min PRN    ramipril (Altace) 10 mg capsule TAKE 1 CAPSULE BY MOUTH DAILY       ROS    Vital Signs:  Vitals:    04/09/25 1530   BP: 99/62   BP Location: Left arm   Patient Position: Sitting   BP Cuff Size: Adult   Pulse: 60   SpO2: 95%   Weight: 61 kg (134 lb 6.4 oz)   Height: 1.664 m (5' 5.5\")     Wt Readings from Last 2 Encounters:   04/09/25 61 kg (134 lb 6.4 oz)   03/20/25 61.2 kg (135 lb)     Body mass index is 22.03 kg/m².     Physical Exam:    She appeared well.  There were no carotid bruits.  The lungs were clear.  The heart sounds were regular, no murmurs appreciated.  No edema.  No signs of PAD.     Lab Review:  CBC:  Lab Results   Component Value Date    WBC 4.8 02/28/2025    HGB 14.3 02/28/2025    HCT 44.0 02/28/2025    MCV 94.0 02/28/2025     02/28/2025       CMP:  Recent Labs     02/28/25  0905   GLUCOSE 142*      K 3.9   CL 95*   CO2 33*   ANIONGAP 9   BUN 16   CREATININE 0.77   EGFR 79   ALBUMIN 4.5   ALKPHOS 148   PROT 7.4   ALT 36*   AST 37*   BILITOT 0.6         LIPID PANEL:  Lab Results   Component Value Date    CHOL 121 02/28/2025    HDL 68 02/28/2025    CHHDL 1.8 02/28/2025    VLDL " 11 03/14/2024    TRIG 44 02/28/2025    NHDL 53 02/28/2025       HEME/ENDO:  Lab Results   Component Value Date    HGBA1C 8.3 (H) 02/28/2025    HGBA1C 7.7 (A) 10/22/2024    TSH 1.98 02/28/2025         Recent Labs     09/22/20  1638 09/14/20  1329   BNP 29 33     Recent Cardiology Tests:    ECG:    Not performed today.    Results for orders placed in visit on 06/19/24    ECG 12 lead (Clinic Performed)    Narrative  Normal sinus rhythm  T wave abnormality, consider anterior ischemia  Abnormal ECG  When compared with ECG of 16-NOV-2022 13:18,  No significant change was found  Confirmed by Jake Smith ([id number]) on 6/22/2024 10:02:25 AM       Echo:  Echo Results:  No results found for this or any previous visit from the past 3650 days.       Cath:      Stress Test:  Stress Results:  No results found for this or any previous visit from the past 365 days.         Cardiac Imaging:        Assessment/Plan   We reviewed the patient's problem list.  She has remained clinically stable.  Noninvasive cardiac testing shows no evidence for ischemia, LV function is preserved, lipids are at goal.  No side effects from treatment with an SGLT2 inhibitor.  She does have scheduled follow-up with endocrinology for management of diabetes.  Blood pressures are under excellent control.  Would not recommend any further diagnostic testing.  Follow-up with primary care and endocrinology, return to cardiology clinic in 12 to 18 months.    Orders:  No orders of the defined types were placed in this encounter.     Followup Appts:  Future Appointments   Date Time Provider Department Center   5/28/2025 11:00 AM Jono Cardenas MD [id number] [facility]   6/18/2025  1:00 PM Jake Smith MD [id number] [facility]           ____________________________________________________________  Jake Smith MD    Senior Attending Physician  [facility] [place]  [facility]  [employer]  [facility] [employer] [facility]

## 2025-04-12 DIAGNOSIS — I10 ESSENTIAL HYPERTENSION: ICD-10-CM

## 2025-04-14 RX ORDER — CHLORTHALIDONE 25 MG/1
TABLET ORAL
Qty: 90 TABLET | Refills: 3 | Status: SHIPPED | OUTPATIENT
Start: 2025-04-14

## 2025-05-22 ENCOUNTER — OFFICE VISIT (OUTPATIENT)
Dept: URGENT CARE | Age: 80
End: 2025-05-22
Payer: MEDICARE

## 2025-05-22 VITALS
OXYGEN SATURATION: 95 % | DIASTOLIC BLOOD PRESSURE: 72 MMHG | TEMPERATURE: 97.7 F | RESPIRATION RATE: 16 BRPM | SYSTOLIC BLOOD PRESSURE: 112 MMHG | HEART RATE: 62 BPM

## 2025-05-22 DIAGNOSIS — R42 DIZZINESS: Primary | ICD-10-CM

## 2025-05-22 NOTE — PROGRESS NOTES
HPI:  Patient states that she had chinese for dinner last night and had her blood pressure and blood sugar medications.  Pt states that today early in the morning she had to use the restroom and after she got up to go to the bathroom she started to feel dizzy.  Pt states that dizziness did not feel like any spinning sensation.  Pt states that she did not have any other symptoms at the time like HA, blurry vision, CP, SOB or numbness/weakness in extremities. Pt states that she checked her BP and it was 174/77 and her blood sugar was 107. Pt states that she took her morning meds for blood pressure and blood sugar and her symptoms fully resolved after about 20 minutes.  Pt has no symptoms at this time.          ROS:  No cp  No sob  No n/v/diarrhea  No AP  No HA  No blurry vision    PE:    A&O x3  NCAT  PERRLA, EOMI  TM clear bl  No pharyngeal erythema  RRR  CTAB  CN 2-12 intact as tested  MOEx4  No focal deficit  Judgement normal    A/P:   Dizziness    Your symptoms were likely secondary to elevated blood pressure.  Avoid salt in diet.  Increase fluids.  Continue with current medications.  Keep a blood pressure diary at home.  Recheck with your doctor if blood pressure remains elevated.  Keep a diary of symptoms.  Go to the ER if starts getting worse.

## 2025-05-28 ENCOUNTER — APPOINTMENT (OUTPATIENT)
Dept: PRIMARY CARE | Facility: CLINIC | Age: 80
End: 2025-05-28
Payer: MEDICARE

## 2025-06-10 ENCOUNTER — APPOINTMENT (OUTPATIENT)
Dept: PRIMARY CARE | Facility: CLINIC | Age: 80
End: 2025-06-10
Payer: MEDICARE

## 2025-06-18 ENCOUNTER — APPOINTMENT (OUTPATIENT)
Dept: CARDIOLOGY | Facility: CLINIC | Age: 80
End: 2025-06-18
Payer: MEDICARE

## 2025-06-18 DIAGNOSIS — I10 ESSENTIAL HYPERTENSION: Primary | ICD-10-CM

## 2025-06-21 DIAGNOSIS — E11.69 TYPE 2 DIABETES MELLITUS WITH OTHER SPECIFIED COMPLICATION, WITH LONG-TERM CURRENT USE OF INSULIN: ICD-10-CM

## 2025-06-21 DIAGNOSIS — Z79.4 TYPE 2 DIABETES MELLITUS WITH OTHER SPECIFIED COMPLICATION, WITH LONG-TERM CURRENT USE OF INSULIN: ICD-10-CM

## 2025-06-23 RX ORDER — BLOOD-GLUCOSE METER
KIT MISCELLANEOUS
Qty: 100 STRIP | Refills: 3 | Status: SHIPPED | OUTPATIENT
Start: 2025-06-23

## 2025-06-25 ENCOUNTER — APPOINTMENT (OUTPATIENT)
Dept: PRIMARY CARE | Facility: CLINIC | Age: 80
End: 2025-06-25
Payer: MEDICARE

## 2025-06-25 VITALS
WEIGHT: 131 LBS | HEART RATE: 72 BPM | SYSTOLIC BLOOD PRESSURE: 114 MMHG | BODY MASS INDEX: 21.47 KG/M2 | DIASTOLIC BLOOD PRESSURE: 76 MMHG

## 2025-06-25 DIAGNOSIS — E11.69 TYPE 2 DIABETES MELLITUS WITH OTHER SPECIFIED COMPLICATION, UNSPECIFIED WHETHER LONG TERM INSULIN USE (MULTI): Primary | ICD-10-CM

## 2025-06-25 DIAGNOSIS — R63.4 ABNORMAL WEIGHT LOSS: ICD-10-CM

## 2025-06-25 PROCEDURE — 99214 OFFICE O/P EST MOD 30 MIN: CPT | Performed by: INTERNAL MEDICINE

## 2025-06-25 PROCEDURE — 1126F AMNT PAIN NOTED NONE PRSNT: CPT | Performed by: INTERNAL MEDICINE

## 2025-06-25 PROCEDURE — 3074F SYST BP LT 130 MM HG: CPT | Performed by: INTERNAL MEDICINE

## 2025-06-25 PROCEDURE — 3078F DIAST BP <80 MM HG: CPT | Performed by: INTERNAL MEDICINE

## 2025-06-25 PROCEDURE — 1036F TOBACCO NON-USER: CPT | Performed by: INTERNAL MEDICINE

## 2025-06-25 PROCEDURE — G2211 COMPLEX E/M VISIT ADD ON: HCPCS | Performed by: INTERNAL MEDICINE

## 2025-06-25 ASSESSMENT — PAIN SCALES - GENERAL: PAINLEVEL_OUTOF10: 0-NO PAIN

## 2025-06-25 NOTE — PROGRESS NOTES
Subjective   Patient ID: Julio Dutta [place] is a 80 y.o. female who presents for Follow-up.  History of Present Illness  The patient presents for evaluation of abnormal weight loss, pituitary adenoma, diabetes, hypertension, hyperlipidemia, coronary artery disease, and degenerative joint disease.    She has been experiencing weight loss, which she attributes to her Jardiance medication. She is considering discontinuing this medication. Her blood glucose levels have been well-controlled, although she reports consuming pretzels at night. She is on Jardiance 25 mg, metformin, and glyburide.    She reports no recent falls or symptoms of depression. She has a living will and healthcare power of  in place. She occasionally consults an ophthalmologist and plans to visit a dentist soon. She underwent cataract surgery and has been driving since then. She had a consultation with her cardiologist on 04/09/2025 and plans to schedule another appointment before the end of the year. She has completed her mammogram screening.    She is on atorvastatin, ramipril, metoprolol, and chlorthalidone. She does not take amlodipine.    PAST SURGICAL HISTORY:  Cataract surgery      PMHx, FHx, Social Hx, Surg Hx personally reviewed at this appointment. No pertinent findings and/or changes from prior (if applicable).    ROS: Unless specified above, pt denies wt gain/loss f/c HA LoC CP SOB NVDC. See HPI above, and scanned sheet (if applicable). All other systems are reviewed and are without complaint.     Objective     /76   Pulse 72   Wt 59.4 kg (131 lb)   BMI 21.47 kg/m²      Physical Exam  General Appearance: Normal appearance.  Pulmonary: Clear to auscultation, no wheezing, rales or rhonchi.  Cardiovascular: Regular rate and rhythm, no murmurs, rubs, or gallops.  Abdominal: Soft, no tenderness, no distention, no masses.  Extremities: No peripheral edema, no ulcers.  Neurological: Alert.      Lab Results   Component  Value Date    WBC 4.8 02/28/2025    HGB 14.3 02/28/2025    HCT 44.0 02/28/2025     02/28/2025    CHOL 121 02/28/2025    TRIG 44 02/28/2025    HDL 68 02/28/2025    ALT 36 (H) 02/28/2025    AST 37 (H) 02/28/2025     02/28/2025    K 3.9 02/28/2025    CL 95 (L) 02/28/2025    CREATININE 0.77 02/28/2025    BUN 16 02/28/2025    CO2 33 (H) 02/28/2025    TSH 1.98 02/28/2025    INR 1.1 09/14/2020    HGBA1C 8.3 (H) 02/28/2025     par     Current Outpatient Medications   Medication Instructions    ascorbic acid, vitamin C, 500 mg capsule 1 capsule, Daily    aspirin 81 mg, Daily RT    atorvastatin (Lipitor) 80 mg tablet TAKE 1 TABLET BY MOUTH DAILY    chlorthalidone (Hygroton) 25 mg tablet TAKE 1 TABLET BY MOUTH EVERY DAY    cholecalciferol (VITAMIN D-3) 250 mcg, Daily    cyanocobalamin (Vitamin B-12) 1,000 mcg tablet 1 tablet, Daily    diphenhydrAMINE (BENADRYL ALLERGY) 25 mg, Every 6 hours PRN    empagliflozin (JARDIANCE) 25 mg, oral, Daily    FreeStyle Lite Strips TEST THREE TIMES DAILY    gabapentin (NEURONTIN) 300 mg, Nightly    glyBURIDE (Diabeta) 5 mg tablet TAKE 2 TABLETS BY MOUTH TWICE DAILY BEFORE MEALS    metFORMIN XR (GLUCOPHAGE-XR) 500 mg, Daily with evening meal    metoprolol tartrate (Lopressor) 50 mg tablet TAKE 1 TABLET BY MOUTH TWICE DAILY WITH MEALS    nitroglycerin (NITROSTAT) 0.4 mg, sublingual, Every 5 min PRN    ramipril (Altace) 10 mg capsule TAKE 1 CAPSULE BY MOUTH DAILY        BI mammo bilateral screening tomosynthesis  Narrative: Interpreted By:  Stacia Winter,   STUDY:  BI MAMMO BILATERAL SCREENING TOMOSYNTHESIS;  3/20/2025 11:59 am      ACCESSION NUMBER(S):  [id number]      ORDERING CLINICIAN:  ZULY DOSS      INDICATION:  Screening.      ,Z12.31 Encounter for screening mammogram for malignant neoplasm of  breast      COMPARISON:  08/22/2023 06/21/2022      FINDINGS:  2D and tomosynthesis images were reviewed at 1 mm slice thickness.      Density:  The breasts are heterogeneously  dense, which may obscure  small masses.      No suspicious masses or calcifications are identified.      Impression: No mammographic evidence of malignancy.      BI-RADS CATEGORY:  BI-RADS Category:  1 Negative.  Recommendation:  Annual Screening.  Recommended Date:  1 Year.  Laterality:  Bilateral.              For any future breast imaging appointments, please call [phone]  [phone]).          MACRO:  None      Signed by: Stacia Winter 3/31/2025 11:19 AM  Dictation workstation:   [id number]           Assessment & Plan  1. Abnormal weight loss.  - A CT scan will be conducted to further investigate the cause of the weight loss.  - Blood test required before the CT scan.  - Discussion about potential side effects of Jardiance contributing to weight loss.  - Follow-up scheduled in 3 months.    2. Pituitary adenoma.  - Condition remains stable.  - No new symptoms reported.  - Regular monitoring continues.  - No changes in treatment plan.    3. Diabetes.  - Hemoglobin A1c test will be performed to monitor diabetes management.  - Blood sugar levels reported as generally good, with occasional fluctuations based on diet.  - Discussion about current medications including Jardiance, metformin, and glyburide.  - No changes in medication regimen.    4. Hypertension.  - Hypertension is currently well-managed with existing medication regimen.  - Regular medications include ramipril, metoprolol, and chlorthalidone.  - Cardiovascular exam normal with no abnormalities detected.  - No changes in treatment plan.    5. Hyperlipidemia.  - Continuation of statin therapy (atorvastatin).  - Lipid levels are being managed effectively.  - No new symptoms or concerns reported.  - No changes in medication regimen.    6. Coronary artery disease.  - Risk factors will be modified.  - Follow-up appointment with cardiology scheduled.  - Regular monitoring of heart health.  - Discussion about the benefits of Jardiance for heart health.    7.  Degenerative joint disease.  - Symptoms remain stable.  - No new symptoms or concerns reported.  - Regular monitoring continues.  - No changes in treatment plan.    8. Health maintenance.  - Tetanus vaccine to be administered at the pharmacy.  - Colonoscopy has been completed.  - Mammogram is up to date.  - Bone density is up to date.  - Regular health maintenance checks continue.          Jono Cardenas MD       This medical note was created with the assistance of artificial intelligence (AI) for documentation purposes. The content has been reviewed and confirmed by the healthcare provider for accuracy and completeness. Patient consented to the use of audio recording and use of AI during their visit.

## 2025-06-26 LAB
ALBUMIN/CREAT UR: 32 MG/G CREAT
ANION GAP SERPL CALCULATED.4IONS-SCNC: 8 MMOL/L (CALC) (ref 7–17)
BUN SERPL-MCNC: 17 MG/DL (ref 7–25)
BUN/CREAT SERPL: ABNORMAL (CALC) (ref 6–22)
CALCIUM SERPL-MCNC: 9.8 MG/DL (ref 8.6–10.4)
CHLORIDE SERPL-SCNC: 97 MMOL/L (ref 98–110)
CO2 SERPL-SCNC: 32 MMOL/L (ref 20–32)
CREAT SERPL-MCNC: 0.76 MG/DL (ref 0.6–1)
CREAT UR-MCNC: 62 MG/DL (ref 20–275)
EGFRCR SERPLBLD CKD-EPI 2021: 80 ML/MIN/1.73M2
EST. AVERAGE GLUCOSE BLD GHB EST-MCNC: 163 MG/DL
EST. AVERAGE GLUCOSE BLD GHB EST-SCNC: 9 MMOL/L
GLUCOSE SERPL-MCNC: 147 MG/DL (ref 65–99)
HBA1C MFR BLD: 7.3 %
MICROALBUMIN UR-MCNC: 2 MG/DL
POTASSIUM SERPL-SCNC: 4.3 MMOL/L (ref 3.5–5.3)
SODIUM SERPL-SCNC: 137 MMOL/L (ref 135–146)

## 2025-07-18 ENCOUNTER — APPOINTMENT (OUTPATIENT)
Dept: RADIOLOGY | Facility: HOSPITAL | Age: 80
End: 2025-07-18
Payer: MEDICARE

## 2025-08-08 ENCOUNTER — APPOINTMENT (OUTPATIENT)
Dept: RADIOLOGY | Facility: HOSPITAL | Age: 80
End: 2025-08-08
Payer: MEDICARE

## 2025-08-21 DIAGNOSIS — I10 ESSENTIAL HYPERTENSION: ICD-10-CM

## 2025-08-21 RX ORDER — CHLORTHALIDONE 25 MG/1
25 TABLET ORAL DAILY
Qty: 90 TABLET | Refills: 3 | Status: SHIPPED | OUTPATIENT
Start: 2025-08-21

## 2025-08-25 DIAGNOSIS — E78.5 HYPERLIPIDEMIA, UNSPECIFIED HYPERLIPIDEMIA TYPE: ICD-10-CM

## 2025-08-25 RX ORDER — ATORVASTATIN CALCIUM 80 MG/1
80 TABLET, FILM COATED ORAL
Qty: 90 TABLET | Refills: 3 | Status: SHIPPED | OUTPATIENT
Start: 2025-08-25

## 2025-09-05 DIAGNOSIS — I10 ESSENTIAL HYPERTENSION: ICD-10-CM

## 2025-09-05 RX ORDER — RAMIPRIL 10 MG/1
10 CAPSULE ORAL
Qty: 90 CAPSULE | Refills: 3 | Status: SHIPPED | OUTPATIENT
Start: 2025-09-05

## 2025-09-25 ENCOUNTER — APPOINTMENT (OUTPATIENT)
Dept: PRIMARY CARE | Facility: CLINIC | Age: 80
End: 2025-09-25
Payer: MEDICARE